# Patient Record
Sex: MALE | Race: WHITE | NOT HISPANIC OR LATINO | Employment: FULL TIME | URBAN - METROPOLITAN AREA
[De-identification: names, ages, dates, MRNs, and addresses within clinical notes are randomized per-mention and may not be internally consistent; named-entity substitution may affect disease eponyms.]

---

## 2017-02-20 NOTE — Clinical Note
February 21, 2017        Kevin Stoddard  [address] [place] [address]        Dear Kevin:      .This letter is to inform you the you are due for an annual appointment in March. Please contact our scheduling department at [phone] To schedule       If you have any questions or concerns, please don't hesitate to call.        Sincerely,        FRANKIE Su.    Electronically Signed

## 2017-02-21 RX ORDER — NIACIN 750 MG/1
TABLET, EXTENDED RELEASE ORAL
Qty: 180 TAB | Refills: 3 | Status: SHIPPED | OUTPATIENT
Start: 2017-02-21 | End: 2017-02-24 | Stop reason: SDUPTHER

## 2017-02-24 ENCOUNTER — PATIENT MESSAGE (OUTPATIENT)
Dept: MEDICAL GROUP | Facility: MEDICAL CENTER | Age: 52
End: 2017-02-24

## 2017-02-24 RX ORDER — NIACIN 750 MG/1
TABLET, EXTENDED RELEASE ORAL
Qty: 180 TAB | Refills: 0 | Status: SHIPPED | OUTPATIENT
Start: 2017-02-24 | End: 2017-11-08 | Stop reason: SDUPTHER

## 2017-04-17 ENCOUNTER — OFFICE VISIT (OUTPATIENT)
Dept: MEDICAL GROUP | Facility: PHYSICIAN GROUP | Age: 52
End: 2017-04-17
Payer: COMMERCIAL

## 2017-04-17 VITALS
SYSTOLIC BLOOD PRESSURE: 118 MMHG | DIASTOLIC BLOOD PRESSURE: 82 MMHG | TEMPERATURE: 97.7 F | HEIGHT: 68 IN | BODY MASS INDEX: 32.74 KG/M2 | OXYGEN SATURATION: 94 % | HEART RATE: 78 BPM | WEIGHT: 216 LBS | RESPIRATION RATE: 16 BRPM

## 2017-04-17 DIAGNOSIS — Z76.89 ENCOUNTER TO ESTABLISH CARE WITH NEW DOCTOR: ICD-10-CM

## 2017-04-17 DIAGNOSIS — Z12.11 COLON CANCER SCREENING: ICD-10-CM

## 2017-04-17 DIAGNOSIS — Z12.83 SKIN CANCER SCREENING: ICD-10-CM

## 2017-04-17 DIAGNOSIS — K21.9 GASTROESOPHAGEAL REFLUX DISEASE WITHOUT ESOPHAGITIS: ICD-10-CM

## 2017-04-17 PROCEDURE — 99214 OFFICE O/P EST MOD 30 MIN: CPT | Performed by: NURSE PRACTITIONER

## 2017-04-17 ASSESSMENT — PATIENT HEALTH QUESTIONNAIRE - PHQ9: CLINICAL INTERPRETATION OF PHQ2 SCORE: 0

## 2017-04-17 NOTE — ASSESSMENT & PLAN NOTE
Patient is aware of BMI elevation.  Brief discussion of diet, exercise, and lifestyle modification.

## 2017-04-17 NOTE — Clinical Note
[facility]  DOROTHY IsaacsPFreddieN.  [address]  [place] [address]  Fax: [phone]   Authorization for Release/Disclosure of   Protected Health Information   Name: PARMINDER STONE : 1965 SSN: [id number]   Address: [address] [place] [address] [place] [address] Phone:    [phone] (home) [phone] (work)   I authorize the entity listed below to release/disclose the PHI below to:   [facility]/JOE Isaacs and JOE Isaacs   Provider or Entity Name:   Dr. Ortiz/ [facility] Address City, State, Zip   Phone:   [phone]    Fax:   [phone]   Reason for request: continuity of care   Information to be released:    [  ] LAST COLONOSCOPY,  including any PATH REPORT and follow-up  [  ] LAST FIT/COLOGUARD RESULT [  ] LAST DEXA  [  ] LAST MAMMOGRAM  [  ] LAST PAP  [  ] LAST LABS [  ] RETINA EXAM REPORT  [  ] IMMUNIZATION RECORDS  [x] Release all info      [  ] Check here and initial the line next to each item to release ALL health information INCLUDING  _____ Care and treatment for drug and / or alcohol abuse  _____ HIV testing, infection status, or AIDS  _____ Genetic Testing    DATES OF SERVICE OR TIME PERIOD TO BE DISCLOSED: _____________  I understand and acknowledge that:  * This Authorization may be revoked at any time by you in writing, except if your health information has already been used or disclosed.  * Your health information that will be used or disclosed as a result of you signing this authorization could be re-disclosed by the recipient. If this occurs, your re-disclosed health information may no longer be protected by State or Federal laws.  * You may refuse to sign this Authorization. Your refusal will not affect your ability to obtain treatment.  * This Authorization becomes effective upon signing and will  on (date) __________.      If no date is indicated, this Authorization will  one (1) year from the signature date.       Name: Kevin Dick Richi    Signature:   Date:     4/17/2017       PLEASE FAX REQUESTED RECORDS BACK TO: [phone]

## 2017-04-17 NOTE — MR AVS SNAPSHOT
"        Kevin Stoddard   2017 8:40 AM   Office Visit   MRN: [id number]    Department:  [facility]   Dept Phone:  [phone]    Description:  Male : 1965   Provider:  JOE Isaacs           Reason for Visit     Establish Care           Allergies as of 2017     Allergen Noted Reactions    Nkda [No Known Drug Allergy] 2008         You were diagnosed with     Encounter to establish care with new doctor   [271499]       Skin cancer screening   [827395]       BMI 32.0-32.9,adult   [740551]       Colon cancer screening   [726433]         Vital Signs     Blood Pressure Pulse Temperature Respirations Height Weight    118/82 mmHg 78 36.5 °C (97.7 °F) 16 1.727 m (5' 7.99\") 97.977 kg (216 lb)    Body Mass Index Oxygen Saturation Smoking Status             32.85 kg/m2 94% Never Smoker          Basic Information     Date Of Birth Sex Race Ethnicity Preferred Language    1965 Male White Non- English      Problem List              ICD-10-CM Priority Class Noted - Resolved    Hyperlipidemia E78.5   2015 - Present    Elevated fasting glucose R73.01   2015 - Present    Left foot pain M79.672   2015 - Present    Ganglion of right wrist M67.431   2015 - Present    Gastroesophageal reflux disease without esophagitis K21.9   3/4/2016 - Present    Encounter to establish care with new doctor Z71.89   2017 - Present      Health Maintenance        Date Due Completion Dates    COLONOSCOPY 2017 ---    IMM DTaP/Tdap/Td Vaccine (2 - Td) 2025            Current Immunizations     Influenza Vaccine Quad Inj (Pf) 2016 10:13 AM, 10/9/2014    Influenza Vaccine Quad Inj (Preserved) 10/23/2015    Tdap Vaccine 2015      Below and/or attached are the medications your provider expects you to take. Review all of your home medications and newly ordered medications with your provider and/or pharmacist. Follow medication instructions as " directed by your provider and/or pharmacist. Please keep your medication list with you and share with your provider. Update the information when medications are discontinued, doses are changed, or new medications (including over-the-counter products) are added; and carry medication information at all times in the event of emergency situations     Allergies:  NKDA - (reactions not documented)               Medications  Valid as of: April 17, 2017 -  9:31 AM    Generic Name Brand Name Tablet Size Instructions for use    Fluticasone Propionate (Suspension) FLONASE 50 MCG/ACT Spray 2 Sprays in nose every day.        Multiple Vitamins-Minerals (Tab) MENS MULTI VITAMIN & MINERAL  Take  by mouth.        Niacin (Antihyperlipidemic) (Tab CR) NIASPAN 750 MG TAKE TWO TABLETS BY MOUTH DAILY        Omega-3 Fatty Acids (Cap) Fish Oil 1200 MG Take 2 Capsule by mouth every day.        Omeprazole Magnesium (Tablet Delayed Response) PRILOSEC OTC 20 MG Take 20 mg by mouth every day.        Simvastatin (Tab) ZOCOR 20 MG Take 1 Tab by mouth every evening.        .                 Medicines prescribed today were sent to:     [place] [address] [place] [address] [place] [address] [facility] [address]    Phone: [phone] Fax: [phone]    Open 24 Hours?: No      Medication refill instructions:       If your prescription bottle indicates you have medication refills left, it is not necessary to call your provider’s office. Please contact your pharmacy and they will refill your medication.    If your prescription bottle indicates you do not have any refills left, you may request refills at any time through one of the following ways: The online [employer] system (except Urgent Care), by calling your provider’s office, or by asking your pharmacy to contact your provider’s office with a refill request. Medication refills are processed only during regular business hours and may not be available until the next business day. Your  provider may request additional information or to have a follow-up visit with you prior to refilling your medication.   *Please Note: Medication refills are assigned a new Rx number when refilled electronically. Your pharmacy may indicate that no refills were authorized even though a new prescription for the same medication is available at the pharmacy. Please request the medicine by name with the pharmacy before contacting your provider for a refill.        Referral     A referral request has been sent to our patient care coordination department. Please allow 3-5 business days for us to process this request and contact you either by phone or mail. If you do not hear from us by the 5th business day, please call us at [phone].           Pro V&V Access Code: Activation code not generated  Current Pro V&V Status: Active

## 2017-04-17 NOTE — PROGRESS NOTES
Chief Complaint   Patient presents with   • Establish Care       HISTORY OF PRESENT ILLNESS: Patient is a 52 y.o. male  patient who is here to reestablish care with me in this office.  He was a patient of my previous office.  He is here today to discuss the following issues:    Encounter to establish care with new doctor  Is here to reestablish care with me.  Was seen by Dr. Putnam in the interim.    Gastroesophageal reflux disease without esophagitis  Stable on prilosec.      Skin cancer screening  Would like a referral to dermatology for a skin screening.    Colon cancer screening  Is due for screening colonoscopy.    BMI 32.0-32.9,adult  Patient is aware of BMI elevation.  Brief discussion of diet, exercise, and lifestyle modification.        Patient Active Problem List    Diagnosis Date Noted   • Encounter to establish care with new doctor 04/17/2017   • Skin cancer screening 04/17/2017   • Colon cancer screening 04/17/2017   • BMI 32.0-32.9,adult 04/17/2017   • Gastroesophageal reflux disease without esophagitis 03/04/2016   • Hyperlipidemia 11/06/2015   • Elevated fasting glucose 11/06/2015   • Left foot pain 11/06/2015   • Ganglion of right wrist 11/06/2015       Allergies:Nkda    Current Outpatient Prescriptions   Medication Sig Dispense Refill   • niacin SR (NIASPAN) 750 MG Tab CR TAKE TWO TABLETS BY MOUTH DAILY 180 Tab 0   • fluticasone (FLONASE) 50 MCG/ACT nasal spray Spray 2 Sprays in nose every day.     • simvastatin (ZOCOR) 20 MG Tab Take 1 Tab by mouth every evening. 30 Tab 11   • Omega-3 Fatty Acids (FISH OIL) 1200 MG Cap Take 2 Capsule by mouth every day.     • Multiple Vitamins-Minerals (MENS MULTI VITAMIN & MINERAL) Tab Take  by mouth.     • omeprazole (PRILOSEC OTC) 20 MG tablet Take 20 mg by mouth every day.       No current facility-administered medications for this visit.       Social History   Substance Use Topics   • Smoking status: Never Smoker    • Smokeless tobacco: Never Used   •  "Alcohol Use: 0.0 oz/week     0 Standard drinks or equivalent per week      Comment: 1 per month       Family Status   Relation Status Death Age   • Mother     • Father Alive    • Sister Alive    • Brother Alive    • Son Alive    • Son Alive      Family History   Problem Relation Age of Onset   • Cancer Mother      ovarian, breast   • Heart Disease Father    • Diabetes Father    • Diabetes Brother        Review of Systems:   Constitutional: Negative for fever, chills, weight loss and malaise/fatigue.   HENT: Negative for ear pain, nosebleeds, congestion, sore throat and neck pain.    Eyes: Negative for blurred vision.   Respiratory: Negative for cough, sputum production, shortness of breath and wheezing.    Cardiovascular: Negative for chest pain, palpitations, orthopnea and leg swelling.   Gastrointestinal: Negative for heartburn, nausea, vomiting and abdominal pain.   Genitourinary: Negative for dysuria, urgency and frequency.   Musculoskeletal: Negative for myalgias, joint pain, and back pain.  Skin: Negative for rash and itching. Would like a skin check with dermatology.  Neurological: Negative for dizziness, tingling, tremors, sensory change, focal weakness and headaches.   Endo/Heme/Allergies: Does not bruise/bleed easily.   Psychiatric/Behavioral: Negative for depression, suicidal ideas and memory loss.  The patient is not nervous/anxious and does not have insomnia.    All other systems reviewed and are negative except as in HPI.    Exam:  Blood pressure 118/82, pulse 78, temperature 36.5 °C (97.7 °F), resp. rate 16, height 1.727 m (5' 7.99\"), weight 97.977 kg (216 lb), SpO2 94 %.  General:  Well nourished, well developed male in NAD  Head: Grossly normal.  Neck: Supple without JVD or bruit. Thyroid is not enlarged.  Pulmonary: Clear to ausculation. Normal effort. No rales, ronchi, or wheezing.  Cardiovascular: Regular rate and rhythm without murmur.   Extremities: No clubbing, cyanosis, or " edema.  Skin: Intact with no obvious rashes or lesions.  Neuro: Grossly intact.  Psych: Alert and oriented x 3.  Mood and affect appropriate.    Medical decision-making and discussion: Kevin is here to reestablish care with me.  We reviewed his past medical history and discussed his current medications. Referrals were sent to dermatology and GI for colonoscopy. He will sign a records release for my previous office, he will sign up with [facility], and he will plan to follow-up here as needed.         Assessment/Plan:  1. Encounter to establish care with new doctor     2. Skin cancer screening  REFERRAL TO DERMATOLOGY   3. BMI 32.0-32.9,adult  Patient identified as having weight management issue.  Appropriate orders and counseling given.   4. Colon cancer screening  REFERRAL TO GI FOR COLONOSCOPY   5. Gastroesophageal reflux disease without esophagitis         Return if symptoms worsen or fail to improve.    Please note that this dictation was created using voice recognition software. I have made every reasonable attempt to correct obvious errors, but I expect that there are errors of grammar and possibly content that I did not discover before finalizing the note.

## 2017-04-19 DIAGNOSIS — Z91.89 RISK OF EXPOSURE TO LYME DISEASE: ICD-10-CM

## 2017-04-19 DIAGNOSIS — R53.83 OTHER FATIGUE: ICD-10-CM

## 2017-04-19 DIAGNOSIS — E78.5 HYPERLIPIDEMIA, UNSPECIFIED HYPERLIPIDEMIA TYPE: ICD-10-CM

## 2017-04-19 DIAGNOSIS — M25.50 ARTHRALGIA, UNSPECIFIED JOINT: ICD-10-CM

## 2017-04-24 ENCOUNTER — HOSPITAL ENCOUNTER (OUTPATIENT)
Dept: LAB | Facility: MEDICAL CENTER | Age: 52
End: 2017-04-24
Attending: NURSE PRACTITIONER
Payer: COMMERCIAL

## 2017-04-24 DIAGNOSIS — Z91.89 RISK OF EXPOSURE TO LYME DISEASE: ICD-10-CM

## 2017-04-24 DIAGNOSIS — M25.50 ARTHRALGIA, UNSPECIFIED JOINT: ICD-10-CM

## 2017-04-24 DIAGNOSIS — R53.83 OTHER FATIGUE: ICD-10-CM

## 2017-04-24 LAB
BASOPHILS # BLD AUTO: 0.6 % (ref 0–1.8)
BASOPHILS # BLD: 0.03 K/UL (ref 0–0.12)
CRP SERPL HS-MCNC: 1.5 MG/L (ref 0–7.5)
EOSINOPHIL # BLD AUTO: 0.17 K/UL (ref 0–0.51)
EOSINOPHIL NFR BLD: 3.3 % (ref 0–6.9)
ERYTHROCYTE [DISTWIDTH] IN BLOOD BY AUTOMATED COUNT: 43.9 FL (ref 35.9–50)
ERYTHROCYTE [SEDIMENTATION RATE] IN BLOOD BY WESTERGREN METHOD: 6 MM/HOUR (ref 0–20)
HCT VFR BLD AUTO: 45.5 % (ref 42–52)
HCYS SERPL-SCNC: 8.89 UMOL/L
HGB BLD-MCNC: 15.1 G/DL (ref 14–18)
IMM GRANULOCYTES # BLD AUTO: 0 K/UL (ref 0–0.11)
IMM GRANULOCYTES NFR BLD AUTO: 0 % (ref 0–0.9)
LYMPHOCYTES # BLD AUTO: 2.06 K/UL (ref 1–4.8)
LYMPHOCYTES NFR BLD: 40 % (ref 22–41)
MCH RBC QN AUTO: 28 PG (ref 27–33)
MCHC RBC AUTO-ENTMCNC: 33.2 G/DL (ref 33.7–35.3)
MCV RBC AUTO: 84.4 FL (ref 81.4–97.8)
MONOCYTES # BLD AUTO: 0.61 K/UL (ref 0–0.85)
MONOCYTES NFR BLD AUTO: 11.8 % (ref 0–13.4)
NEUTROPHILS # BLD AUTO: 2.28 K/UL (ref 1.82–7.42)
NEUTROPHILS NFR BLD: 44.3 % (ref 44–72)
NRBC # BLD AUTO: 0 K/UL
NRBC BLD AUTO-RTO: 0 /100 WBC
PLATELET # BLD AUTO: 258 K/UL (ref 164–446)
PMV BLD AUTO: 9.8 FL (ref 9–12.9)
PSA SERPL-MCNC: 0.27 NG/ML (ref 0–4)
RBC # BLD AUTO: 5.39 M/UL (ref 4.7–6.1)
TESTOST SERPL-MCNC: 269 NG/DL (ref 175–781)
URATE SERPL-MCNC: 6.9 MG/DL (ref 2.5–8.3)
WBC # BLD AUTO: 5.2 K/UL (ref 4.8–10.8)

## 2017-04-24 PROCEDURE — 85025 COMPLETE CBC W/AUTO DIFF WBC: CPT

## 2017-04-24 PROCEDURE — 36415 COLL VENOUS BLD VENIPUNCTURE: CPT

## 2017-04-24 PROCEDURE — 86617 LYME DISEASE ANTIBODY: CPT

## 2017-04-24 PROCEDURE — 84550 ASSAY OF BLOOD/URIC ACID: CPT

## 2017-04-24 PROCEDURE — 83090 ASSAY OF HOMOCYSTEINE: CPT

## 2017-04-24 PROCEDURE — 84153 ASSAY OF PSA TOTAL: CPT

## 2017-04-24 PROCEDURE — 86160 COMPLEMENT ANTIGEN: CPT

## 2017-04-24 PROCEDURE — 85652 RBC SED RATE AUTOMATED: CPT

## 2017-04-24 PROCEDURE — 84403 ASSAY OF TOTAL TESTOSTERONE: CPT

## 2017-04-24 PROCEDURE — 86141 C-REACTIVE PROTEIN HS: CPT

## 2017-04-24 PROCEDURE — 81291 MTHFR GENE: CPT

## 2017-04-26 LAB
B BURGDOR IGG SER QL IB: NEGATIVE
B BURGDOR IGM SER QL IB: NEGATIVE
TEST NAME 95000: NORMAL

## 2017-05-01 LAB
MTHFR C.1298A>C GENO BLD/T: NEGATIVE
MTHFR C.677C>T GENO BLD/T: NEGATIVE
MTHFR GENE MUT ANL BLD/T: NORMAL

## 2017-05-02 LAB — MISCELLANEOUS LAB RESULT MISCLAB: NORMAL

## 2017-05-10 LAB — MISCELLANEOUS LAB RESULT MISCLAB: NORMAL

## 2017-05-16 RX ORDER — SIMVASTATIN 20 MG
20 TABLET ORAL EVERY EVENING
Qty: 90 TAB | Refills: 1 | Status: SHIPPED | OUTPATIENT
Start: 2017-05-16 | End: 2017-11-08 | Stop reason: SDUPTHER

## 2017-05-16 NOTE — TELEPHONE ENCOUNTER
See MA's notes below, pt has met protocol, OV 4/17   Lab Results   Component Value Date/Time    LDL CHOLESTEROL 112* 05/05/2015 12:29 PM    HDL 47 11/17/2016 06:48 AM

## 2017-05-16 NOTE — TELEPHONE ENCOUNTER
Pt has had OV within the 12 month protocol and lipid panel is current from 11/16. 6 month supply sent to pharmacy.   Lab Results   Component Value Date/Time    CHOLESTEROL, 11/17/2016 06:48 AM    LDL 51 11/17/2016 06:48 AM    HDL 47 11/17/2016 06:48 AM    TRIGLYCERIDES 141 11/17/2016 06:48 AM       Lab Results   Component Value Date/Time    SODIUM 141 11/14/2015 10:41 AM    POTASSIUM 4.1 11/14/2015 10:41 AM    CHLORIDE 105 11/14/2015 10:41 AM    CO2 29 11/14/2015 10:41 AM    GLUCOSE 111* 11/17/2016 06:48 AM    BUN 13 11/14/2015 10:41 AM    CREATININE 1.19 11/14/2015 10:41 AM     Lab Results   Component Value Date/Time    ALKALINE PHOSPHATASE 59 11/14/2015 10:41 AM    AST(SGOT) 35 11/14/2015 10:41 AM    ALT(SGPT) 54* 11/14/2015 10:41 AM    TOTAL BILIRUBIN 0.6 11/14/2015 10:41 AM

## 2017-05-16 NOTE — TELEPHONE ENCOUNTER
Was the patient seen in the last year in this department? Yes     Does patient have an active prescription for medications requested? No     Received Request Via: Pharmacy     Iraj with [facility] requesting refill. Ph: [phone]

## 2017-09-07 ENCOUNTER — RX ONLY (OUTPATIENT)
Age: 52
Setting detail: RX ONLY
End: 2017-09-07

## 2017-11-08 DIAGNOSIS — E78.5 HYPERLIPIDEMIA, UNSPECIFIED HYPERLIPIDEMIA TYPE: ICD-10-CM

## 2017-11-08 DIAGNOSIS — R73.01 ELEVATED FASTING GLUCOSE: ICD-10-CM

## 2017-11-08 NOTE — TELEPHONE ENCOUNTER
Was the patient seen in the last year in this department? Yes     Does patient have an active prescription for medications requested? No     Received Request Via: Patient   PATIENT SWITCHED PHARMACIES.

## 2017-11-08 NOTE — TELEPHONE ENCOUNTER
Was the patient seen in the last year in this department? Yes     Does patient have an active prescription for medications requested? No     Received Request Via: Pharmacy      Pt met protocol?: Yes, labs 11/15 ov 4/17

## 2017-11-09 RX ORDER — SIMVASTATIN 20 MG
20 TABLET ORAL EVERY EVENING
Qty: 90 TAB | Refills: 0 | Status: SHIPPED | OUTPATIENT
Start: 2017-11-09 | End: 2018-02-07 | Stop reason: SDUPTHER

## 2017-11-09 RX ORDER — NIACIN 750 MG/1
TABLET, EXTENDED RELEASE ORAL
Qty: 180 TAB | Refills: 0 | Status: SHIPPED | OUTPATIENT
Start: 2017-11-09 | End: 2018-05-02

## 2018-02-08 RX ORDER — SIMVASTATIN 20 MG
TABLET ORAL
Qty: 90 TAB | Refills: 0 | Status: SHIPPED | OUTPATIENT
Start: 2018-02-08 | End: 2023-06-14

## 2018-02-08 NOTE — TELEPHONE ENCOUNTER
Was the patient seen in the last year in this department? Yes     Does patient have an active prescription for medications requested? No     Received Request Via: Pharmacy      Pt met protocol?: No pt last ov 4/17   Lab Results  Component Value Date/Time   CHOLSTRLTOT 126 11/17/2016 0648   TRIGLYCERIDE 141 11/17/2016 0648   HDL 47 11/17/2016 0648   LDL 51 11/17/2016 0648

## 2018-05-02 ENCOUNTER — APPOINTMENT (OUTPATIENT)
Dept: RADIOLOGY | Facility: MEDICAL CENTER | Age: 53
End: 2018-05-02
Attending: EMERGENCY MEDICINE
Payer: OTHER MISCELLANEOUS

## 2018-05-02 ENCOUNTER — HOSPITAL ENCOUNTER (OUTPATIENT)
Facility: MEDICAL CENTER | Age: 53
End: 2018-05-03
Attending: EMERGENCY MEDICINE | Admitting: HOSPITALIST
Payer: OTHER MISCELLANEOUS

## 2018-05-02 DIAGNOSIS — R42 DIZZINESS: ICD-10-CM

## 2018-05-02 DIAGNOSIS — R27.0 ATAXIA: ICD-10-CM

## 2018-05-02 LAB
ALBUMIN SERPL BCP-MCNC: 4.5 G/DL (ref 3.2–4.9)
ALBUMIN/GLOB SERPL: 1.8 G/DL
ALP SERPL-CCNC: 74 U/L (ref 30–99)
ALT SERPL-CCNC: 105 U/L (ref 2–50)
ANION GAP SERPL CALC-SCNC: 11 MMOL/L (ref 0–11.9)
APTT PPP: 22.9 SEC (ref 24.7–36)
AST SERPL-CCNC: 56 U/L (ref 12–45)
BASOPHILS # BLD AUTO: 1 % (ref 0–1.8)
BASOPHILS # BLD: 0.06 K/UL (ref 0–0.12)
BILIRUB SERPL-MCNC: 0.6 MG/DL (ref 0.1–1.5)
BUN SERPL-MCNC: 14 MG/DL (ref 8–22)
CALCIUM SERPL-MCNC: 9.7 MG/DL (ref 8.5–10.5)
CHLORIDE SERPL-SCNC: 105 MMOL/L (ref 96–112)
CO2 SERPL-SCNC: 21 MMOL/L (ref 20–33)
CREAT SERPL-MCNC: 1.01 MG/DL (ref 0.5–1.4)
EKG IMPRESSION: NORMAL
EOSINOPHIL # BLD AUTO: 0.16 K/UL (ref 0–0.51)
EOSINOPHIL NFR BLD: 2.6 % (ref 0–6.9)
ERYTHROCYTE [DISTWIDTH] IN BLOOD BY AUTOMATED COUNT: 43.8 FL (ref 35.9–50)
EST. AVERAGE GLUCOSE BLD GHB EST-MCNC: 126 MG/DL
GLOBULIN SER CALC-MCNC: 2.5 G/DL (ref 1.9–3.5)
GLUCOSE SERPL-MCNC: 133 MG/DL (ref 65–99)
HBA1C MFR BLD: 6 % (ref 0–5.6)
HCT VFR BLD AUTO: 43.5 % (ref 42–52)
HGB BLD-MCNC: 14.8 G/DL (ref 14–18)
IMM GRANULOCYTES # BLD AUTO: 0.02 K/UL (ref 0–0.11)
IMM GRANULOCYTES NFR BLD AUTO: 0.3 % (ref 0–0.9)
INR PPP: 1.01 (ref 0.87–1.13)
LYMPHOCYTES # BLD AUTO: 2 K/UL (ref 1–4.8)
LYMPHOCYTES NFR BLD: 32.2 % (ref 22–41)
MCH RBC QN AUTO: 27.7 PG (ref 27–33)
MCHC RBC AUTO-ENTMCNC: 34 G/DL (ref 33.7–35.3)
MCV RBC AUTO: 81.5 FL (ref 81.4–97.8)
MONOCYTES # BLD AUTO: 0.6 K/UL (ref 0–0.85)
MONOCYTES NFR BLD AUTO: 9.7 % (ref 0–13.4)
NEUTROPHILS # BLD AUTO: 3.37 K/UL (ref 1.82–7.42)
NEUTROPHILS NFR BLD: 54.2 % (ref 44–72)
NRBC # BLD AUTO: 0 K/UL
NRBC BLD-RTO: 0 /100 WBC
PLATELET # BLD AUTO: 299 K/UL (ref 164–446)
PMV BLD AUTO: 9.9 FL (ref 9–12.9)
POTASSIUM SERPL-SCNC: 3.9 MMOL/L (ref 3.6–5.5)
PROT SERPL-MCNC: 7 G/DL (ref 6–8.2)
PROTHROMBIN TIME: 13 SEC (ref 12–14.6)
RBC # BLD AUTO: 5.34 M/UL (ref 4.7–6.1)
SODIUM SERPL-SCNC: 137 MMOL/L (ref 135–145)
TROPONIN I SERPL-MCNC: <0.01 NG/ML (ref 0–0.04)
WBC # BLD AUTO: 6.2 K/UL (ref 4.8–10.8)

## 2018-05-02 PROCEDURE — 99220 PR INITIAL OBSERVATION CARE,LEVL III: CPT | Performed by: HOSPITALIST

## 2018-05-02 PROCEDURE — A9270 NON-COVERED ITEM OR SERVICE: HCPCS | Performed by: HOSPITALIST

## 2018-05-02 PROCEDURE — 99285 EMERGENCY DEPT VISIT HI MDM: CPT

## 2018-05-02 PROCEDURE — 700102 HCHG RX REV CODE 250 W/ 637 OVERRIDE(OP): Performed by: HOSPITALIST

## 2018-05-02 PROCEDURE — 96375 TX/PRO/DX INJ NEW DRUG ADDON: CPT

## 2018-05-02 PROCEDURE — G0378 HOSPITAL OBSERVATION PER HR: HCPCS

## 2018-05-02 PROCEDURE — 83036 HEMOGLOBIN GLYCOSYLATED A1C: CPT

## 2018-05-02 PROCEDURE — 700111 HCHG RX REV CODE 636 W/ 250 OVERRIDE (IP): Performed by: EMERGENCY MEDICINE

## 2018-05-02 PROCEDURE — 71045 X-RAY EXAM CHEST 1 VIEW: CPT

## 2018-05-02 PROCEDURE — 85025 COMPLETE CBC W/AUTO DIFF WBC: CPT

## 2018-05-02 PROCEDURE — 94760 N-INVAS EAR/PLS OXIMETRY 1: CPT

## 2018-05-02 PROCEDURE — 93005 ELECTROCARDIOGRAM TRACING: CPT

## 2018-05-02 PROCEDURE — 80053 COMPREHEN METABOLIC PANEL: CPT

## 2018-05-02 PROCEDURE — 70450 CT HEAD/BRAIN W/O DYE: CPT

## 2018-05-02 PROCEDURE — 84484 ASSAY OF TROPONIN QUANT: CPT

## 2018-05-02 PROCEDURE — 85610 PROTHROMBIN TIME: CPT

## 2018-05-02 PROCEDURE — 96374 THER/PROPH/DIAG INJ IV PUSH: CPT

## 2018-05-02 PROCEDURE — 93005 ELECTROCARDIOGRAM TRACING: CPT | Performed by: EMERGENCY MEDICINE

## 2018-05-02 PROCEDURE — 85730 THROMBOPLASTIN TIME PARTIAL: CPT

## 2018-05-02 PROCEDURE — 96361 HYDRATE IV INFUSION ADD-ON: CPT

## 2018-05-02 PROCEDURE — 700105 HCHG RX REV CODE 258: Performed by: EMERGENCY MEDICINE

## 2018-05-02 RX ORDER — PROMETHAZINE HYDROCHLORIDE 25 MG/1
12.5-25 TABLET ORAL EVERY 4 HOURS PRN
Status: DISCONTINUED | OUTPATIENT
Start: 2018-05-02 | End: 2018-05-03 | Stop reason: HOSPADM

## 2018-05-02 RX ORDER — OMEPRAZOLE 20 MG/1
20 CAPSULE, DELAYED RELEASE ORAL DAILY
Status: DISCONTINUED | OUTPATIENT
Start: 2018-05-03 | End: 2018-05-03 | Stop reason: HOSPADM

## 2018-05-02 RX ORDER — PROMETHAZINE HYDROCHLORIDE 25 MG/1
12.5-25 SUPPOSITORY RECTAL EVERY 4 HOURS PRN
Status: DISCONTINUED | OUTPATIENT
Start: 2018-05-02 | End: 2018-05-03 | Stop reason: HOSPADM

## 2018-05-02 RX ORDER — ONDANSETRON 2 MG/ML
INJECTION INTRAMUSCULAR; INTRAVENOUS
Status: DISPENSED
Start: 2018-05-02 | End: 2018-05-02

## 2018-05-02 RX ORDER — FLUTICASONE PROPIONATE 50 MCG
2 SPRAY, SUSPENSION (ML) NASAL DAILY
Status: DISCONTINUED | OUTPATIENT
Start: 2018-05-03 | End: 2018-05-03 | Stop reason: HOSPADM

## 2018-05-02 RX ORDER — MECLIZINE HYDROCHLORIDE 25 MG/1
25 TABLET ORAL 3 TIMES DAILY PRN
Status: DISCONTINUED | OUTPATIENT
Start: 2018-05-02 | End: 2018-05-03 | Stop reason: HOSPADM

## 2018-05-02 RX ORDER — MIDAZOLAM HYDROCHLORIDE 1 MG/ML
3 INJECTION INTRAMUSCULAR; INTRAVENOUS ONCE
Status: DISCONTINUED | OUTPATIENT
Start: 2018-05-02 | End: 2018-05-03

## 2018-05-02 RX ORDER — ONDANSETRON 2 MG/ML
4 INJECTION INTRAMUSCULAR; INTRAVENOUS EVERY 4 HOURS PRN
Status: DISCONTINUED | OUTPATIENT
Start: 2018-05-02 | End: 2018-05-03 | Stop reason: HOSPADM

## 2018-05-02 RX ORDER — LORAZEPAM 2 MG/ML
1 INJECTION INTRAMUSCULAR ONCE
Status: COMPLETED | OUTPATIENT
Start: 2018-05-02 | End: 2018-05-02

## 2018-05-02 RX ORDER — PROMETHAZINE HYDROCHLORIDE 25 MG/1
25 SUPPOSITORY RECTAL ONCE
Status: DISCONTINUED | OUTPATIENT
Start: 2018-05-02 | End: 2018-05-03

## 2018-05-02 RX ORDER — SIMVASTATIN 20 MG
20 TABLET ORAL EVERY EVENING
Status: DISCONTINUED | OUTPATIENT
Start: 2018-05-02 | End: 2018-05-03 | Stop reason: HOSPADM

## 2018-05-02 RX ORDER — ONDANSETRON 4 MG/1
4 TABLET, ORALLY DISINTEGRATING ORAL EVERY 4 HOURS PRN
Status: DISCONTINUED | OUTPATIENT
Start: 2018-05-02 | End: 2018-05-03 | Stop reason: HOSPADM

## 2018-05-02 RX ORDER — POLYETHYLENE GLYCOL 3350 17 G/17G
1 POWDER, FOR SOLUTION ORAL
Status: DISCONTINUED | OUTPATIENT
Start: 2018-05-02 | End: 2018-05-03 | Stop reason: HOSPADM

## 2018-05-02 RX ORDER — AMOXICILLIN 250 MG
2 CAPSULE ORAL 2 TIMES DAILY
Status: DISCONTINUED | OUTPATIENT
Start: 2018-05-02 | End: 2018-05-03 | Stop reason: HOSPADM

## 2018-05-02 RX ORDER — SODIUM CHLORIDE 9 MG/ML
1000 INJECTION, SOLUTION INTRAVENOUS ONCE
Status: COMPLETED | OUTPATIENT
Start: 2018-05-02 | End: 2018-05-02

## 2018-05-02 RX ORDER — ONDANSETRON 2 MG/ML
8 INJECTION INTRAMUSCULAR; INTRAVENOUS ONCE
Status: COMPLETED | OUTPATIENT
Start: 2018-05-02 | End: 2018-05-02

## 2018-05-02 RX ORDER — BISACODYL 10 MG
10 SUPPOSITORY, RECTAL RECTAL
Status: DISCONTINUED | OUTPATIENT
Start: 2018-05-02 | End: 2018-05-03 | Stop reason: HOSPADM

## 2018-05-02 RX ADMIN — LORAZEPAM 1 MG: 2 INJECTION INTRAMUSCULAR; INTRAVENOUS at 10:36

## 2018-05-02 RX ADMIN — SODIUM CHLORIDE 1000 ML: 9 INJECTION, SOLUTION INTRAVENOUS at 10:36

## 2018-05-02 RX ADMIN — ONDANSETRON 8 MG: 2 INJECTION, SOLUTION INTRAMUSCULAR; INTRAVENOUS at 10:36

## 2018-05-02 RX ADMIN — SIMVASTATIN 20 MG: 20 TABLET, FILM COATED ORAL at 20:24

## 2018-05-02 ASSESSMENT — PATIENT HEALTH QUESTIONNAIRE - PHQ9
2. FEELING DOWN, DEPRESSED, IRRITABLE, OR HOPELESS: NOT AT ALL
1. LITTLE INTEREST OR PLEASURE IN DOING THINGS: NOT AT ALL
2. FEELING DOWN, DEPRESSED, IRRITABLE, OR HOPELESS: NOT AT ALL
SUM OF ALL RESPONSES TO PHQ9 QUESTIONS 1 AND 2: 0
SUM OF ALL RESPONSES TO PHQ9 QUESTIONS 1 AND 2: 0
1. LITTLE INTEREST OR PLEASURE IN DOING THINGS: NOT AT ALL

## 2018-05-02 ASSESSMENT — ENCOUNTER SYMPTOMS
PALPITATIONS: 0
DIZZINESS: 1
HEADACHES: 0
SPUTUM PRODUCTION: 0
NAUSEA: 0
NECK PAIN: 0
CHILLS: 0
VOMITING: 0
ORTHOPNEA: 0
COUGH: 0
HEMOPTYSIS: 0
BACK PAIN: 0

## 2018-05-02 ASSESSMENT — PAIN SCALES - GENERAL
PAINLEVEL_OUTOF10: 0

## 2018-05-02 ASSESSMENT — LIFESTYLE VARIABLES
ALCOHOL_USE: NO
DO YOU DRINK ALCOHOL: NO

## 2018-05-02 NOTE — ED NOTES
Discussed additional med with pt (phenergan suppository) and gave option for PO after speaking with MD. Pt states feeling better now that stretcher is not moving. Would like to hold med for now. Will call RN if needs it.

## 2018-05-02 NOTE — ED PROVIDER NOTES
"ED Provider Note    CHIEF COMPLAINT  Chief Complaint   Patient presents with   • N/V     pt reports that symptoms started today at 0730 after eating in cafeteria here.   • Dizziness   • Weakness       HPI  Kevin Stoddard is a 53 y.o. male who presents for evaluation of dizziness and vomiting.  The patient states that he felt fine when he got up this morning.  He brought his father into the hospital for replacement of a pacemaker.  While sitting in the waiting area he developed dizziness and some of which somewhat vertiginous and also feels like he might be off balance when he was walking.  He then developed significant nausea with couple bouts of vomiting associated with this along with weakness and some diaphoresis.  Patient states he feels like he could be short of breath, but states he may \"just be hyperventilating\".  He denies recent: Fever, chills, URI symptoms, chest pain, abdominal pain, hematemesis, melena, hematochezia, hematuria, dysuria, unilateral motor weakness or paresthesias, difficulty speaking, headache.  No other acute symptomatology or complaints.    REVIEW OF SYSTEMS  See HPI for further details.  He denies a history of: Protection, diabetes, thyroid dysfunction, seizures, cardiopulmonary disorders, gastrointestinal disorders.  All other systems negative.    PAST MEDICAL HISTORY  Past Medical History:   Diagnosis Date   • Cancer (HCC)     basal cell , face   • Indigestion    • kidney stones    Dyslipidemia    FAMILY HISTORY  Family History   Problem Relation Age of Onset   • Cancer Mother      ovarian, breast   • Heart Disease Father    • Diabetes Father    • Diabetes Brother        SOCIAL HISTORY  Nonsmoker; occasional alcohol use; no drug use;    SURGICAL HISTORY  Past Surgical History:   Procedure Laterality Date   • GANGLION EXCISION Right 6/3/2016    Procedure: GANGLION EXCISION WRIST;  Surgeon: Aman Easton M.D.;  Location: SURGERY SAME DAY [facility];  Service:    • " SEPTOTURBINOPLASTY  5/6/2010    Performed by ANURADHA JONAS at SURGERY SAME DAY [facility] ORS   • ETHMOIDECTOMY  5/6/2010    Performed by ANURADHA JONAS at SURGERY SAME DAY [facility] ORS   • ANTROSTOMY  5/6/2010    Performed by ANURADHA JONAS at SURGERY SAME DAY [facility] ORS   • OTHER      eus. tubes/adenoids   • OTHER ABDOMINAL SURGERY      vasectomy       CURRENT MEDICATIONS  Home Medications     Reviewed by Becky Campa R.N. (Registered Nurse) on 05/02/18 at [facility]  Med List Status: Partial   Medication Last Dose Status   fluticasone (FLONASE) 50 MCG/ACT nasal spray  Active   Multiple Vitamins-Minerals (MENS MULTI VITAMIN & MINERAL) Tab  Active   niacin SR (NIASPAN) 750 MG Tab CR  Active   Omega-3 Fatty Acids (FISH OIL) 1200 MG Cap  Active   omeprazole (PRILOSEC OTC) 20 MG tablet  Active   simvastatin (ZOCOR) 20 MG Tab  Active                ALLERGIES  Allergies   Allergen Reactions   • Nkda [No Known Drug Allergy]        PHYSICAL EXAM  VITAL SIGNS: /75   Pulse 82   Temp 37 °C (98.6 °F)   Resp 16   Wt 92.8 kg (204 lb 9.4 oz)   SpO2 98%   BMI 31.11 kg/m²    Constitutional: 53-year-old male, fairly Well developed, Well nourished, appears weak but oriented ×3  HENT: ,Atraumatic, Bilateral external ears normal, tympanic membranes clear, Oropharynx mildly dry, No oral exudates, Nose normal.   Eyes: PERRL, EOMI, Conjunctiva normal, No discharge.   Neck: Normal range of motion, No tenderness, Supple, No stridor.   Lymphatic: No lymphadenopathy noted.   Cardiovascular: Normal heart rate, Normal rhythm, No murmurs, No rubs, No gallops.   Thorax & Lungs: Normal Equal breath sounds, No respiratory distress, No wheezing, no stridor, no rales. No chest tenderness.   Abdomen: Soft, nontender, nondistended, no organomegaly, positive bowel sounds normal in quality. No guarding or rebound.  Skin: Mildly decreased skin turgor, pink, warm, dry. No rashes, petechiae, purpura. Normal capillary refill.   Back: No  tenderness, No CVA tenderness.   Extremities: Intact distal pulses, No edema, No tenderness, No cyanosis, No clubbing. Vascular: Pulses are 2+, symmetric in the upper and lower extremities.  Musculoskeletal: Good range of motion in all major joints. No tenderness to palpation or major deformities noted.   Neurologic: Alert & oriented x 3, Normal motor function, Normal sensory function, No gross focal deficits noted.   Psychiatric: Affect normal, Judgment normal, Mood normal.     EKG  I have interpreted: Rate normal, rhythm sinus, axis normal, P-R QRS Q-T intervals normal, no acute ischemic or injury changes, 12-lead EKG, no old tracing for comparison;    RADIOLOGY/PROCEDURES  CT-HEAD W/O   Final Result      1.  Head CT without contrast within normal limits. No evidence of acute cerebral infarction, hemorrhage or mass lesion.      DX-CHEST-PORTABLE (1 VIEW)   Final Result      No radiographic evidence of acute cardiopulmonary process.            COURSE & MEDICAL DECISION MAKING  Pertinent Labs & Imaging studies reviewed. (See chart for details)  1.  Monitor  2.  IV normal saline; IV fluids administered for clinical dehydration along with active vomiting  3.  Zofran, titrated  4.  Ativan, titrated    Laboratory studies: CBC and differential within normal limits; CMP shows a random glucose 233, AST 56, , otherwise within normal; coags within normal limits; troponin is 0.01;    Discussion/consultation: At this time, the patient presents for evaluation of dizziness.  Symptoms are difficult to differentiate between ataxia and dizziness versus peripheral vertigo.  Patient's initial workup has been negative.  The patient has had persistent vomiting despite the above noted treatment.  At this time, I spoke with the hospitalist on-call.  The patient will be admitted for further monitoring, treatment, care.    FINAL IMPRESSION  1. Dizziness    2. Ataxia           PLAN  1.  The patient will be admitted for further  monitoring, treatment, and care.    Electronically signed by: Guy G Gansert, 5/2/2018 10:11 AM

## 2018-05-02 NOTE — ED NOTES
Assumed care of pt from waiting room. Pt to room via WC.  Changed to gown, hooked to monitor- VSS. PIV placed with blood draw. Fall precautions in place. Call bell in reach. Awaiting MD eval/orders. Ongoing monitoring.

## 2018-05-02 NOTE — DISCHARGE PLANNING
Care Transition Team Assessment    Information Source  Orientation : Oriented x 4  Information Given By: Patient  Who is responsible for making decisions for patient? : Patient    Readmission Evaluation  Is this a readmission?: No    Elopement Risk  Legal Hold: No  Ambulatory or Self Mobile in Wheelchair: No-Not an Elopement Risk    Interdisciplinary Discharge Planning  Does Admitting Nurse Feel This Could be a Complex Discharge?: No  Primary Care Physician: Dayan  Lives with - Patient's Self Care Capacity: Spouse  Patient or legal guardian wants to designate a caregiver (see row info): No  Support Systems: Family Member(s), Friends / Neighbors  Housing / Facility: [address]  Do You Take your Prescribed Medications Regularly: Yes  Able to Return to Previous ADL's: Yes  Mobility Issues: No  Prior Services: None  Patient Expects to be Discharged to:: home  Durable Medical Equipment: Not Applicable    Discharge Preparedness  What is your plan after discharge?: Uncertain - pending medical team collaboration  What are your discharge supports?: Spouse  Prior Functional Level: Ambulatory  Difficulity with ADLs: None    Functional Assesment  Prior Functional Level: Ambulatory    Finances  Financial Barriers to Discharge: Yes  Prescription Coverage: Yes    Vision / Hearing Impairment  Vision Impairment : No  Hearing Impairment : No    Values / Beliefs / Concerns  Values / Beliefs Concerns : No    Advance Directive  Advance Directive?: None  Advance Directive offered?: AD Booklet refused    Domestic Abuse  Have you ever been the victim of abuse or violence?: No  Physical Abuse or Sexual Abuse: No  Verbal Abuse or Emotional Abuse: No  Possible Abuse Reported to:: Not Applicable         Discharge Risks or Barriers  Discharge risks or barriers?: No    Anticipated Discharge Information  Anticipated discharge disposition: Home  Discharge Address: face sheet

## 2018-05-02 NOTE — ED TRIAGE NOTES
Chief Complaint   Patient presents with   • N/V     pt reports that symptoms started today at 0730 after eating in cafeteria here.   • Dizziness   • Weakness     Pt with vomiting x 2 since arrival. In obvious discomfort.  Blood pressure 129/75, pulse 82, temperature 37 °C (98.6 °F), resp. rate 16, weight 92.8 kg (204 lb 9.4 oz), SpO2 98 %.    Pt to R11

## 2018-05-02 NOTE — ASSESSMENT & PLAN NOTE
Concerning features of a vertigo included acute onset and severe symptoms with nausea vomiting  CT head is unrevealing  MRI brain to investigate for cerebellar infarct  PRN Meclizine

## 2018-05-03 ENCOUNTER — APPOINTMENT (OUTPATIENT)
Dept: RADIOLOGY | Facility: MEDICAL CENTER | Age: 53
End: 2018-05-03
Attending: HOSPITALIST
Payer: OTHER MISCELLANEOUS

## 2018-05-03 VITALS
OXYGEN SATURATION: 92 % | DIASTOLIC BLOOD PRESSURE: 70 MMHG | HEART RATE: 85 BPM | TEMPERATURE: 97.4 F | RESPIRATION RATE: 18 BRPM | WEIGHT: 205.69 LBS | BODY MASS INDEX: 31.28 KG/M2 | SYSTOLIC BLOOD PRESSURE: 109 MMHG

## 2018-05-03 PROBLEM — R42 VERTIGO: Status: RESOLVED | Noted: 2018-05-02 | Resolved: 2018-05-03

## 2018-05-03 PROCEDURE — 306588 SLEEVE,VASO CALF MED: Performed by: HOSPITALIST

## 2018-05-03 PROCEDURE — 97161 PT EVAL LOW COMPLEX 20 MIN: CPT

## 2018-05-03 PROCEDURE — G0378 HOSPITAL OBSERVATION PER HR: HCPCS

## 2018-05-03 PROCEDURE — 700102 HCHG RX REV CODE 250 W/ 637 OVERRIDE(OP): Performed by: HOSPITALIST

## 2018-05-03 PROCEDURE — 96375 TX/PRO/DX INJ NEW DRUG ADDON: CPT

## 2018-05-03 PROCEDURE — G8978 MOBILITY CURRENT STATUS: HCPCS | Mod: CI

## 2018-05-03 PROCEDURE — 70551 MRI BRAIN STEM W/O DYE: CPT

## 2018-05-03 PROCEDURE — A9270 NON-COVERED ITEM OR SERVICE: HCPCS | Performed by: HOSPITALIST

## 2018-05-03 PROCEDURE — 700111 HCHG RX REV CODE 636 W/ 250 OVERRIDE (IP): Performed by: NURSE PRACTITIONER

## 2018-05-03 PROCEDURE — G8980 MOBILITY D/C STATUS: HCPCS | Mod: CI

## 2018-05-03 PROCEDURE — G8979 MOBILITY GOAL STATUS: HCPCS | Mod: CI

## 2018-05-03 PROCEDURE — 99217 PR OBSERVATION CARE DISCHARGE: CPT | Performed by: HOSPITALIST

## 2018-05-03 RX ORDER — MECLIZINE HYDROCHLORIDE 25 MG/1
25 TABLET ORAL 3 TIMES DAILY PRN
Qty: 30 TAB | Refills: 0 | Status: SHIPPED | OUTPATIENT
Start: 2018-05-03 | End: 2023-06-14

## 2018-05-03 RX ORDER — MIDAZOLAM HYDROCHLORIDE 1 MG/ML
3 INJECTION INTRAMUSCULAR; INTRAVENOUS ONCE
Status: COMPLETED | OUTPATIENT
Start: 2018-05-03 | End: 2018-05-03

## 2018-05-03 RX ADMIN — MIDAZOLAM 3 MG: 1 INJECTION INTRAMUSCULAR; INTRAVENOUS at 08:26

## 2018-05-03 RX ADMIN — FLUTICASONE PROPIONATE 100 MCG: 50 SPRAY, METERED NASAL at 10:04

## 2018-05-03 RX ADMIN — OMEPRAZOLE 20 MG: 20 CAPSULE, DELAYED RELEASE ORAL at 10:04

## 2018-05-03 ASSESSMENT — COGNITIVE AND FUNCTIONAL STATUS - GENERAL
SUGGESTED CMS G CODE MODIFIER MOBILITY: CH
MOBILITY SCORE: 24

## 2018-05-03 ASSESSMENT — GAIT ASSESSMENTS
DISTANCE (FEET): 200
GAIT LEVEL OF ASSIST: INDEPENDENT

## 2018-05-03 ASSESSMENT — PAIN SCALES - GENERAL: PAINLEVEL_OUTOF10: 0

## 2018-05-03 NOTE — THERAPY
"Physical Therapy Evaluation completed.   Bed Mobility:  Supine to Sit: Independent  Transfers: Sit to Stand: Independent  Gait: Level Of Assist: Independent with No Equipment Needed       Plan of Care: Patient with no further skilled PT needs in the acute care setting at this time  Discharge Recommendations: Equipment: No Equipment Needed.     Pt presents with mild dizziness that did not increase with cervical rotation, cervical flexion/extension, or positional change. No nystagmus noted during [place] or an increase in symptoms. At this time, pt does not require further acute PT intervention and can follow up with his PCP as he desires.     See \"Rehab Therapy-Acute\" Patient Summary Report for complete documentation.     "

## 2018-05-03 NOTE — H&P
Hospital Medicine History and Physical    Date of Service  5/2/2018    Chief Complaint  Chief Complaint   Patient presents with   • N/V     pt reports that symptoms started today at 0730 after eating in cafeteria here.   • Dizziness   • Weakness       History of Presenting Illness  53 y.o. male who presented 5/2/2018 with dizziness.    Mr. Stoddard has a history of hyperlipidemia .  I persistent emergency room today with acute onset of dizziness. Patient was at doctor's appointment with his father, he says that he leaned up against a wall and subsequently had severe dizziness. The patient said is very difficult for him to stand up and walk she was so unsteady. He had a couple episodes of nonbloody emesis on his brought to the emergency room and evaluated for CT scan shows no acute abnormalities. Patient feels better with IV Zofran but vertigo persists no headache, no neck pain, no recent illnesses, no fevers or chills   Primary Care Physician  Cirilo Hanley A.P.N.    Consultants  None    Code Status  Full code    Review of Systems  Review of Systems   Constitutional: Negative for chills and malaise/fatigue.   Respiratory: Negative for cough, hemoptysis and sputum production.    Cardiovascular: Negative for chest pain, palpitations and orthopnea.   Gastrointestinal: Negative for nausea and vomiting.   Musculoskeletal: Negative for back pain and neck pain.   Skin: Negative for itching and rash.   Neurological: Positive for dizziness. Negative for headaches.   All other systems reviewed and are negative.       Past Medical History  Past Medical History:   Diagnosis Date   • Cancer (HCC)     basal cell , face   • Indigestion    • kidney stones        Surgical History  Past Surgical History:   Procedure Laterality Date   • GANGLION EXCISION Right 6/3/2016    Procedure: GANGLION EXCISION WRIST;  Surgeon: Aman Easton M.D.;  Location: SURGERY SAME DAY [facility];  Service:    • SEPTOTURBINOPLASTY  5/6/2010     Performed by ANURADHA JONAS at SURGERY SAME DAY [facility] ORS   • ETHMOIDECTOMY  2010    Performed by ANURADHA JONAS at SURGERY SAME DAY [facility] ORS   • ANTROSTOMY  2010    Performed by ANURADHA JONAS at SURGERY SAME DAY [facility] ORS   • OTHER      eus. tubes/adenoids   • OTHER ABDOMINAL SURGERY      vasectomy       Medications  No current facility-administered medications on file prior to encounter.      Current Outpatient Prescriptions on File Prior to Encounter   Medication Sig Dispense Refill   • simvastatin (ZOCOR) 20 MG Tab TAKE 1 TABLET BY MOUTH EVERY EVENING 90 Tab 0   • fluticasone (FLONASE) 50 MCG/ACT nasal spray Spray 2 Sprays in nose every day.     • Omega-3 Fatty Acids (FISH OIL) 1200 MG Cap Take 2 Capsule by mouth every day.     • Multiple Vitamins-Minerals (MENS MULTI VITAMIN & MINERAL) Tab Take 1 Tab by mouth every day.     • omeprazole (PRILOSEC OTC) 20 MG tablet Take 20 mg by mouth every day.         Family History  Family History   Problem Relation Age of Onset   • Cancer Mother      ovarian, breast   • Heart Disease Father    • Diabetes Father    • Diabetes Brother        Social History  Social History   Substance Use Topics   • Smoking status: Never Smoker   • Smokeless tobacco: Never Used   • Alcohol use 0.0 oz/week      Comment: 1 per month       Allergies  Allergies   Allergen Reactions   • Nkda [No Known Drug Allergy]         Physical Exam  Laboratory   Hemodynamics  Temp (24hrs), Av.7 °C (98.1 °F), Min:36.1 °C (97 °F), Max:37.2 °C (98.9 °F)   Temperature: 36.7 °C (98 °F)  Pulse  Av  Min: 79  Max: 94 Heart Rate (Monitored): 83  Blood Pressure: 112/72, NIBP: 128/81      Respiratory      Respiration: 16, Pulse Oximetry: 93 %        RUL Breath Sounds: Clear, RML Breath Sounds: Clear, RLL Breath Sounds: Clear, CR Breath Sounds: Clear, LLL Breath Sounds: Clear    Physical Exam   Constitutional: He is oriented to person, place, and time. He appears well-developed and  well-nourished.   Eyes: Conjunctivae and EOM are normal. Right eye exhibits no discharge. Left eye exhibits no discharge.   No nystagmus   Cardiovascular: Normal rate, regular rhythm and intact distal pulses.    No murmur heard.  Pulmonary/Chest: Effort normal and breath sounds normal. No respiratory distress. He has no wheezes.   Abdominal: Soft. Bowel sounds are normal. He exhibits no distension. There is no tenderness. There is no rebound.   Musculoskeletal: Normal range of motion. He exhibits no edema.   Neurological: He is alert and oriented to person, place, and time. No cranial nerve deficit.   Patient is able to sit up in bed and maintain his posture  5/5 strength in UE/LE bilaterally   Skin: Skin is warm and dry. He is not diaphoretic. No erythema.   Psychiatric: He has a normal mood and affect.       Recent Labs      05/02/18   1000   WBC  6.2   RBC  5.34   HEMOGLOBIN  14.8   HEMATOCRIT  43.5   MCV  81.5   MCH  27.7   MCHC  34.0   RDW  43.8   PLATELETCT  299   MPV  9.9     Recent Labs      05/02/18   1000   SODIUM  137   POTASSIUM  3.9   CHLORIDE  105   CO2  21   GLUCOSE  133*   BUN  14   CREATININE  1.01   CALCIUM  9.7     Recent Labs      05/02/18   1000   ALTSGPT  105*   ASTSGOT  56*   ALKPHOSPHAT  74   TBILIRUBIN  0.6   GLUCOSE  133*     Recent Labs      05/02/18   1000   APTT  22.9*   INR  1.01             Lab Results   Component Value Date    TROPONINI <0.01 05/02/2018     Urinalysis:    Lab Results  Component Value Date/Time   SPECGRAVITY 1.004 04/30/2010 1131   GLUCOSEUR Negative 04/30/2010 1131   KETONES Negative 04/30/2010 1131   NITRITE Negative 04/30/2010 1131        Imaging  CT Head:  1.  Head CT without contrast within normal limits. No evidence of acute cerebral infarction, hemorrhage or mass lesion.   Assessment/Plan     I anticipate this patient is appropriate for observation status at this time.    * Vertigo- (present on admission)   Assessment & Plan    Concerning features of a vertigo  included acute onset and severe symptoms with nausea vomiting  CT head is unrevealing  MRI brain to investigate for cerebellar infarct  PRN Meclizine         Hyperlipidemia- (present on admission)   Assessment & Plan    Continue outpatient statin            VTE prophylaxis: SCD's.

## 2018-05-03 NOTE — DISCHARGE SUMMARY
CHIEF COMPLAINT ON ADMISSION  Chief Complaint   Patient presents with   • N/V     pt reports that symptoms started today at 0730 after eating in cafeteria here.   • Dizziness   • Weakness       CODE STATUS  Full Code    HPI & HOSPITAL COURSE  This is a 53 y.o. male with no significant past medical history here with nausea, vomiting, dizziness and weakness. He presented to the emergency room yesterday with acute onset of dizziness. He was here visiting his father who was getting his pacemaker generator changed when he became dizzy and had to lean up against a wall. He has never experienced anything like this in the past. He has had no recent illnesses.  CBC and CMP were unremarkable. Hemoglobin A1c 6.0 with an estimated average glucose 126. Troponin less than 0.01. Twelve-lead EKG showed normal sinus rhythm with no ST elevations or depressions. Overnight telemetry showed no evidence of arrhythmias, tachycardias, bradycardias, or blocks.   CT of the head was unremarkable. Follow-up MRI was also unremarkable. He was evaluated by physical therapy who were unable to reproduce his dizziness and witnessed no nystagmus, therefore the Epley maneuver was not necessary. He is ambulatory independently without chest pain, short of breath, palpitations, dizziness or weakness. He is tolerating a diet without nausea or vomiting. He will be discharged with some meclizine and close follow-up with his PCP. He is instructed to call 911 or return to the emergency department for any emergencies.    Therefore, he is discharged in good and stable condition with close outpatient follow-up.    DISCHARGE PROBLEM LIST  Principal Problem (Resolved):    Vertigo POA: Yes  Active Problems:    Hyperlipidemia POA: Yes      FOLLOW UP  Message was left on the scheduling voicemail to make patient appointment with his PCP within 1 week.      MEDICATIONS ON DISCHARGE     Medication List      START taking these medications      Instructions   meclizine 25  MG Tabs  Commonly known as:  ANTIVERT   Take 1 Tab by mouth 3 times a day as needed for Dizziness.  Dose:  25 mg        CONTINUE taking these medications      Instructions   Fish Oil 1200 MG Caps   Take 2 Capsule by mouth every day.  Dose:  2 Capsule     fluticasone 50 MCG/ACT nasal spray  Commonly known as:  FLONASE   Spray 2 Sprays in nose every day.  Dose:  2 Spray     MENS MULTI VITAMIN & MINERAL Tabs   Take 1 Tab by mouth every day.  Dose:  1 Tab     NON SPECIFIED   Take 4 Tabs by mouth every day. HMR LIGNANS Patient uses for Hair loss and other male stuff  Dose:  4 Tab     PRILOSEC OTC 20 MG tablet  Generic drug:  omeprazole   Take 20 mg by mouth every day.  Dose:  20 mg     simvastatin 20 MG Tabs  Commonly known as:  ZOCOR   Doctor's comments:  Please remind pt to get labs done  TAKE 1 TABLET BY MOUTH EVERY EVENING          DIET  Orders Placed This Encounter   Procedures   • Diet Order     Standing Status:   Standing     Number of Occurrences:   1     Order Specific Question:   Diet:     Answer:   Regular [1]       ACTIVITY  As tolerated.  Weight bearing as tolerated      CONSULTATIONS  NA    PROCEDURES  NA    LABORATORY  Lab Results   Component Value Date/Time    SODIUM 137 05/02/2018 10:00 AM    POTASSIUM 3.9 05/02/2018 10:00 AM    CHLORIDE 105 05/02/2018 10:00 AM    CO2 21 05/02/2018 10:00 AM    GLUCOSE 133 (H) 05/02/2018 10:00 AM    BUN 14 05/02/2018 10:00 AM    CREATININE 1.01 05/02/2018 10:00 AM        Lab Results   Component Value Date/Time    WBC 6.2 05/02/2018 10:00 AM    HEMOGLOBIN 14.8 05/02/2018 10:00 AM    HEMATOCRIT 43.5 05/02/2018 10:00 AM    PLATELETCT 299 05/02/2018 10:00 AM        Total time of the discharge process exceeds 32 minutes   FRANKIE Sim.

## 2018-05-03 NOTE — PROGRESS NOTES
A/o, assessment completed,poc discussed,verbalized understanding,tolerating po well,denies pain,n/t,sob, still has little dizziness, SR on tele hr=91,call button within reach,will continue to monitor.

## 2018-05-03 NOTE — PROGRESS NOTES
Received report from evening RN.  Pt is A/Ox4.  Respirations are even and unlabored on RA.  Monitors applied, VSS.  Pt taken to MRI on mg and monitor.  This RN administered versed before MRI and accompanied pt to MRI, VSS continue to be stable.  Will continue to closely monitor.  Call light within reach

## 2018-05-14 NOTE — DISCHARGE INSTRUCTIONS
Consultation - Cardiology   Burnice [place] 54 y o  male MRN: [id number]  Unit/Bed#:  Encounter: [id number]    Inpatient consult to Cardiology  Consult performed by: Hayley Whelan ordered by: Elyssa Osman          History of Present Illness   Physician Requesting Consult: Juanito Peguero MD  Reason for Consult / Principal Problem:  Atrial fibrillation       Assessment:  1  Atrial fibrillation - presumably new onset - [id number]- VASc = 0  2  Tobacco abuse   3  Syncope  4  Incomplete right bundle branch block    Assessment/ Plan:  Mr Sanchez Massey is a pleasant 46-year-old gentleman with no prior cardiac history admitted to [address] [place] [address] with a syncopal episode  When EMS arrived he was found to have atrial fibrillation with a rapid ventricular response  With this he was asymptomatic  His rate control appears appropriate on his current AV shea blocking agent of metoprolol  He does have a low [id number] score  However given his young age I have recommended that he undergo a AGNIESZKA-guided cardioversion to which he is agreeable  This will be scheduled for sometime in the near future  He will need one month of anticoagulation and thereafter can resume aspirin  He was placed on Eliquis  I have requested a TSH  He has been told he snores at night and therefore he can undergo a home sleep study to rule out obstructive sleep apnea as a contributor to his atrial dysrhythmia  It is unclear if his atrial fibrillation was a contributor to his syncope  He may have had a post-conversion pause but was noted to be in atrial fibrillation upon EMS arrival   He denies any prior episodes  Given this and also to monitor his atrial fibrillation burden I would recommend implantation of a loop recorder which can be done as an outpatient  Further recommendations will be made as his case evolves       HPI: Burndelia Rayna is a 54y o  year old male with no prior cardiac history admitted Discharge Instructions    Discharged to home by car with friend. Discharged via walking, hospital escort: Yes.  Special equipment needed: Not Applicable    Be sure to schedule a follow-up appointment with your primary care doctor or any specialists as instructed.     Discharge Plan:   Diet Plan: Discussed  Activity Level: Discussed  Confirmed Follow up Appointment: Patient to Call and Schedule Appointment  Confirmed Symptoms Management: Discussed  Medication Reconciliation Updated: Yes  Influenza Vaccine Indication: Not indicated: Previously immunized this influenza season and > 8 years of age    I understand that a diet low in cholesterol, fat, and sodium is recommended for good health. Unless I have been given specific instructions below for another diet, I accept this instruction as my diet prescription.   Other diet:     Special Instructions: None    · Is patient discharged on Warfarin / Coumadin?   No     Depression / Suicide Risk    As you are discharged from this [facility] facility, it is important to learn how to keep safe from harming yourself.    Recognize the warning signs:  · Abrupt changes in personality, positive or negative- including increase in energy   · Giving away possessions  · Change in eating patterns- significant weight changes-  positive or negative  · Change in sleeping patterns- unable to sleep or sleeping all the time   · Unwillingness or inability to communicate  · Depression  · Unusual sadness, discouragement and loneliness  · Talk of wanting to die  · Neglect of personal appearance   · Rebelliousness- reckless behavior  · Withdrawal from people/activities they love  · Confusion- inability to concentrate     If you or a loved one observes any of these behaviors or has concerns about self-harm, here's what you can do:  · Talk about it- your feelings and reasons for harming yourself  · Remove any means that you might use to hurt yourself (examples: pills, rope, extension cords,  firearm)  · Get professional help from the community (Mental Health, Substance Abuse, psychological counseling)  · Do not be alone:Call your Safe Contact- someone whom you trust who will be there for you.  · Call your local CRISIS HOTLINE 651-6955 or 086-791-4374  · Call your local Children's Mobile Crisis Response Team [place] (082) 598-4967 or www.PulseOn  · Call the toll free National Suicide Prevention Hotlines   · National Suicide Prevention Lifeline 866-159-ZRCW (6652)  · [employer] Hope Line Network 800-SUICIDE (413-3647)      Dizziness  Dizziness is a common problem. It is a feeling of unsteadiness or light-headedness. You may feel like you are about to faint. Dizziness can lead to injury if you stumble or fall. Anyone can become dizzy, but dizziness is more common in older adults. This condition can be caused by a number of things, including medicines, dehydration, or illness.  Follow these instructions at home:  Taking these steps may help with your condition:  Eating and drinking  · Drink enough fluid to keep your urine clear or pale yellow. This helps to keep you from becoming dehydrated. Try to drink more clear fluids, such as water.  · Do not drink alcohol.  · Limit your caffeine intake if directed by your health care provider.  · Limit your salt intake if directed by your health care provider.  Activity  · Avoid making quick movements.  ¨ Rise slowly from chairs and steady yourself until you feel okay.  ¨ In the morning, first sit up on the side of the bed. When you feel okay, stand slowly while you hold onto something until you know that your balance is fine.  · Move your legs often if you need to  one place for a long time. Tighten and relax your muscles in your legs while you are standing.  · Do not drive or operate heavy machinery if you feel dizzy.  · Avoid bending down if you feel dizzy. Place items in your home so that they are easy for you to reach without leaning  to [address] [place] [address] with a syncopal episode  He states that he had worked a normal work day as an   He came home and mowed his lawn and did other yard work  He was walking to get a hose to wash off his lawn equipment and the next thing he remembers is waking up in the ambulance  He had no preceding symptoms  He denies any prior syncope  Upon EMS arrival he was noted to be in atrial fibrillation with a rapid ventricular response  He was admitted for further evaluation and management  He was initially placed on an IV Cardizem drip and transitioned to an oral regimen of metoprolol  He has no prior cardiac history  He is active at his job and in his yard  With the activity he performs he denies any exertional chest pain or shortness of breath  He denies symptoms of heart failure including increasing lower extremity edema, paroxysmal nocturnal dyspnea, orthopnea, acute weight gain or increasing abdominal girth  He denies any prior syncope or near-syncope  He denies any sensation of palpitations or irregular heartbeat  He denies any symptoms of claudication  He has a history of tobacco abuse and smokes 10 to 15 cigarettes per day  He states his wife states he snores at night  He denies any morning headaches or waking feeling unrefreshed  He does not think there have been any witnessed apneas  ROS:  Positive as per the HPI, all other systems were reviewed and were negative     Historical Information   Past Medical History:   Diagnosis Date    H/O von Willebrand's disease     Migraines     Wrist fracture     left     Past Surgical History:   Procedure Laterality Date    WISDOM TOOTH EXTRACTION       History   Alcohol Use No     History   Drug Use No     History   Smoking Status    Current Every Day Smoker    Packs/day: 1 00    Types: Cigarettes   Smokeless Tobacco    Never Used     Family History: non-contributory    Meds/Allergies   all current active meds have been reviewed       No Known Allergies    Objective   Vitals: Blood pressure 130/80, pulse 96, temperature 98 1 °F (36 7 °C), temperature source Temporal, resp  rate 17, height 5' 11" (1 803 m), weight 70 7 kg (155 lb 13 8 oz), SpO2 98 %  , Body mass index is 21 74 kg/m² , Orthostatic Blood Pressures      Most Recent Value   Blood Pressure  130/80 filed at 05/14/2018 8084   Patient Position - Orthostatic VS  Lying filed at 05/14/2018 1214        Systolic (23LUL), DMR:800 , Min:102 , SIX:273     Diastolic (71LNW), KVP:87, Min:68, Max:106      Intake/Output Summary (Last 24 hours) at 05/14/18 0920  Last data filed at 05/14/18 0844   Gross per 24 hour   Intake             1160 ml   Output             3200 ml   Net            -2040 ml       Weight (last 2 days)     Date/Time   Weight    05/14/18 0600  70 7 (155 87)    05/14/18 0525  70 7 (155 87)    05/13/18 0533  74 3 (163 8)    05/12/18 0530  71 1 (156 75)              Invasive Devices     Peripheral Intravenous Line            Peripheral IV 05/11/18 Right Antecubital 2 days                    Physical Exam: /80   Pulse 96   Temp 98 1 °F (36 7 °C) (Temporal)   Resp 17   Ht 5' 11" (1 803 m)   Wt 70 7 kg (155 lb 13 8 oz)   SpO2 98%   BMI 21 74 kg/m²     General Appearance:    Alert, cooperative, no distress, appears stated age   Head:    Normocephalic, without obvious abnormality, atraumatic   Nose:   Nares normal, septum midline, mucosa normal, no drainage    or sinus tenderness   Throat:   Lips, mucosa, and tongue normal; teeth and gums normal   Neck:   Supple, symmetrical, trachea midline, no adenopathy;        thyroid:  No enlargement/tenderness/nodules; no carotid    bruit or JVD   Back:     Symmetric, no curvature, ROM normal, no CVA tenderness   Lungs:     Clear to auscultation bilaterally, respirations unlabored   Chest wall:    No tenderness or deformity   Heart:    Irregularly irregular rate and rhythm, variable S1/S2, no         murmur over.  Lifestyle  · Do not use any tobacco products, including cigarettes, chewing tobacco, or electronic cigarettes. If you need help quitting, ask your health care provider.  · Try to reduce your stress level, such as with yoga or meditation. Talk with your health care provider if you need help.  General instructions  · Watch your dizziness for any changes.  · Take medicines only as directed by your health care provider. Talk with your health care provider if you think that your dizziness is caused by a medicine that you are taking.  · Tell a friend or a family member that you are feeling dizzy. If he or she notices any changes in your behavior, have this person call your health care provider.  · Keep all follow-up visits as directed by your health care provider. This is important.  Contact a health care provider if:  · Your dizziness does not go away.  · Your dizziness or light-headedness gets worse.  · You feel nauseous.  · You have reduced hearing.  · You have new symptoms.  · You are unsteady on your feet or you feel like the room is spinning.  Get help right away if:  · You vomit or have diarrhea and are unable to eat or drink anything.  · You have problems talking, walking, swallowing, or using your arms, hands, or legs.  · You feel generally weak.  · You are not thinking clearly or you have trouble forming sentences. It may take a friend or family member to notice this.  · You have chest pain, abdominal pain, shortness of breath, or sweating.  · Your vision changes.  · You notice any bleeding.  · You have a headache.  · You have neck pain or a stiff neck.  · You have a fever.  This information is not intended to replace advice given to you by your health care provider. Make sure you discuss any questions you have with your health care provider.  Document Released: 06/13/2002 Document Revised: 05/25/2017 Document Reviewed: 12/14/2015  Elsevier Interactive Patient Education © 2017 [employer]     Abdomen:     Soft, non-tender, bowel sounds active all four quadrants,     no masses, no organomegaly   Extremities:   Extremities normal, atraumatic, no cyanosis or edema   Pulses:   2+ and symmetric all extremities   Skin:   Mutliple ecchymotic areas over his right face and chin   Neurologic:   CNII-XII intact  Normal strength, sensation and reflexes       throughout           Laboratory Results:    Results from last 7 days  Lab Units 05/11/18  1759   TROPONIN I ng/mL <0 02       CBC with diff:   Results from last 7 days  Lab Units 05/14/18  0519 05/13/18  0530 05/12/18  0508   WBC Thousand/uL 8 15 7 17 9 12   HEMOGLOBIN g/dL 14 6 13 4 13 5   HEMATOCRIT % 40 8 37 8 37 8   MCV fL 86 88 87   PLATELETS Thousands/uL 211 176 194   MCH pg 30 8 31 3 31 2   MCHC g/dL 35 8 35 4 35 7   RDW % 11 7 12 1 12 1   MPV fL 9 6 9 6 9 6         CMP:  Results from last 7 days  Lab Units 05/13/18  0530 05/12/18  0508 05/11/18  1759   SODIUM mmol/L 140 141 139   POTASSIUM mmol/L 4 4 4 1 2 9*   CHLORIDE mmol/L 108 108 102   CO2 mmol/L 25 24 23   ANION GAP mmol/L 7 9 14*   BUN mg/dL 10 11 12   CREATININE mg/dL 0 78 0 83 1 07   GLUCOSE RANDOM mg/dL 94 117 182*   CALCIUM mg/dL 7 6* 8 1* 8 7   AST U/L  --  15  --    ALT U/L  --  25  --    ALK PHOS U/L  --  75  --    TOTAL PROTEIN g/dL  --  5 7*  --    BILIRUBIN TOTAL mg/dL  --  0 30  --    EGFR ml/min/1 73sq m 107 105 82         BMP:  Results from last 7 days  Lab Units 05/13/18  0530 05/12/18  0508 05/11/18  1759   SODIUM mmol/L 140 141 139   POTASSIUM mmol/L 4 4 4 1 2 9*   CHLORIDE mmol/L 108 108 102   CO2 mmol/L 25 24 23   BUN mg/dL 10 11 12   CREATININE mg/dL 0 78 0 83 1 07   GLUCOSE RANDOM mg/dL 94 117 182*   CALCIUM mg/dL 7 6* 8 1* 8 7       BNP:  No results for input(s): BNP in the last 72 hours      Magnesium:   Results from last 7 days  Lab Units 05/12/18  0508 05/11/18  1842   MAGNESIUM mg/dL 1 9 1 9       Coags:   Results from last 7 days  Lab Units 05/12/18  0508 05/11/18  1759 PTT seconds 28 26   INR  1 10 1 06       Hemoglobin A1C   Results from last 7 days  Lab Units 05/12/18  0508   HEMOGLOBIN A1C % 5 5             Imaging: I have personally reviewed pertinent reports  Cta Head And Neck W Wo Contrast    Result Date: 5/13/2018  Narrative: CTA NECK AND BRAIN WITH AND WITHOUT CONTRAST INDICATION: syncope and collapse COMPARISON:   5/11/2018 head CT TECHNIQUE:  Routine CT imaging of the Brain without contrast   Post contrast imaging was performed after administration of iodinated contrast through the neck and brain  Post contrast axial 0 625 mm images timed to opacify the arterial system  3D rendering was performed on an independent workstation  MIP reconstructions performed  Coronal reconstructions were performed of the noncontrast portion of the brain  Radiation dose length product (DLP) for this visit:  5514 mGy-cm   This examination, like all CT scans performed in the [facility], was performed utilizing techniques to minimize radiation dose exposure, including the use of iterative reconstruction and automated exposure control  IV Contrast:  100 mL of iohexol (OMNIPAQUE)  IMAGE QUALITY:   Diagnostic FINDINGS: NONCONTRAST BRAIN PARENCHYMA:  No intracranial mass, mass effect or midline shift  No acute intracranial hemorrhage  No CT signs of acute infarction  VENTRICLES AND EXTRA-AXIAL SPACES:  Normal for patient's age  VISUALIZED ORBITS AND PARANASAL SINUSES:  Unremarkable  CALVARIUM AND EXTRACRANIAL SOFT TISSUES:   Normal  CERVICAL VASCULATURE AORTIC ARCH AND GREAT VESSELS:  Normal aortic arch and great vessel origins  Normal visualized subclavian vessels  RIGHT VERTEBRAL ARTERY CERVICAL SEGMENT:  Normal origin  The vessel is normal in caliber throughout the neck  LEFT VERTEBRAL ARTERY CERVICAL SEGMENT:  Normal origin  The vessel is normal in caliber throughout the neck  RIGHT EXTRACRANIAL CAROTID SEGMENT:  Normal caliber common carotid artery    Normal bifurcation and cervical internal carotid artery  No stenosis or dissection  LEFT EXTRACRANIAL CAROTID SEGMENT:  Normal caliber common carotid artery  Normal bifurcation and cervical internal carotid artery  No stenosis or dissection  NASCET criteria was used to determine the degree of internal carotid artery diameter stenosis  INTRACRANIAL VASCULATURE INTERNAL CAROTID ARTERIES:  Normal enhancement of the intracranial portions of the internal carotid arteries  Normal ophthalmic artery origins  Normal ICA terminus  ANTERIOR CIRCULATION:  Symmetric A1 segments and anterior cerebral arteries with normal enhancement  Normal anterior communicating artery  MIDDLE CEREBRAL ARTERY CIRCULATION:  M1 segment and middle cerebral artery branches demonstrate normal enhancement bilaterally  DISTAL VERTEBRAL ARTERIES:  Normal distal vertebral arteries  Posterior inferior cerebellar artery origins are normal  Normal vertebral basilar junction  BASILAR ARTERY:  Basilar artery is normal in caliber  Normal superior cerebellar arteries  POSTERIOR CEREBRAL ARTERIES: Both posterior cerebral arteries arises from the basilar tip  Both arteries demonstrate normal enhancement  Normal posterior communicating arteries  DURAL VENOUS SINUSES:  Normal  NON VASCULAR ANATOMY BONY STRUCTURES:  No acute osseous abnormality  SOFT TISSUES OF THE NECK:  Unremarkable  THORACIC INLET:  Unremarkable  Impression: No significant brain parenchymal abnormalities, consistent with prior study No acute intracranial hemorrhage or mass effect No evidence of significant stenosis, dissection or occlusion involving cervical carotid or vertebral segments or visualized cerebral arteries Workstation performed: [id number]     Xr Neck Soft Tissue    Result Date: 5/13/2018  Narrative: NECK  SOFT TISSUE EXAMINATION INDICATION:   Foreign body   COMPARISON:  None VIEWS:  XR NECK SOFT TISSUE FINDINGS: The epiglottis and aryepiglottic folds are unremarkable in appearance  No radiopaque foreign bodies are appreciated  There are no osseous abnormalities  Impression: Unremarkable study  Workstation performed: [id number]     Xr Chest 1 View Portable    Result Date: 5/11/2018  Narrative: CHEST INDICATION:   Trauma  COMPARISON:  None EXAM PERFORMED/VIEWS:  XR CHEST PORTABLE  AP supine portable FINDINGS: Questionable superior mediastinal widening which could be from brachiocephalic vessels/positioning, follow-up with repeat PA and lateral chest x-rays  The lungs are clear  No pleural effusion  Osseous structures appear within normal limits for patient age  Impression: Questionable superior mediastinal widening which could be from brachiocephalic vessels/positioning, follow-up with repeat PA and lateral chest x-rays  Workstation performed: [id number]     Xr Chest 2 Views    Result Date: 5/11/2018  Narrative: CHEST INDICATION: Trauma, follow-up COMPARISON:  Previous exam same date EXAM PERFORMED/VIEWS:  XR CHEST PA & LATERAL FINDINGS: Persistent somewhat oblique right paratracheal density possibly representing brachiocephalic vessels  Adenopathy could appear similarly  Vascular injury considered less likely  Normal cardiac silhouette  The lungs are clear  No pneumothorax or pleural effusion  Osseous structures appear within normal limits for patient age  Impression: Persistent right paratracheal density suspicious for brachiocephalic vessels or adenopathy  Vascular injury considered less likely Workstation performed: [id number]     Xr Shoulder 2+ Views Left    Result Date: 5/11/2018  Narrative: LEFT SHOULDER INDICATION:   trauma  COMPARISON:  None VIEWS:  XR SHOULDER 2+ VW LEFT FINDINGS: There is no acute fracture or dislocation  No significant degenerative changes  No lytic or blastic lesions are seen  Soft tissues are unremarkable  Impression: No acute osseous abnormality   Workstation performed: [id number]     Ct Head Wo Contrast    Result Date: 5/11/2018  Narrative: CT BRAIN - WITHOUT CONTRAST INDICATION:   trauma  COMPARISON:  None  TECHNIQUE:  CT examination of the brain was performed  In addition to axial images, coronal 2D reformatted images were created and submitted for interpretation  Radiation dose length product (DLP) for this visit:  1077 mGy-cm   This examination, like all CT scans performed in the [facility], was performed utilizing techniques to minimize radiation dose exposure, including the use of iterative reconstruction and automated exposure control  IMAGE QUALITY:  Diagnostic  FINDINGS: PARENCHYMA:  No intracranial mass, mass effect or midline shift  No CT signs of acute infarction  No acute intracranial hemorrhage  VENTRICLES AND EXTRA-AXIAL SPACES:  Normal for the patient's age  VISUALIZED ORBITS AND PARANASAL SINUSES:  Unremarkable  CALVARIUM AND EXTRACRANIAL SOFT TISSUES:  Normal      Impression: No acute intracranial abnormality  Workstation performed: [id number]     Ct Facial Bones Wo Contrast    Result Date: 5/11/2018  Narrative: CT FACIAL BONES WITHOUT INTRAVENOUS CONTRAST INDICATION:   facial trauma  COMPARISON: None  TECHNIQUE:  Axial CT images were obtained through the facial bones with additional sagittal and coronal reconstructions  Radiation dose length product (DLP) for this visit:  358 mGy-cm   This examination, like all CT scans performed in the [facility], was performed utilizing techniques to minimize radiation dose exposure, including the use of iterative reconstruction and automated exposure control  IMAGE QUALITY:  Diagnostic  FINDINGS: FACIAL BONES:   No facial bone fracture identified  Normal alignment of the temporomandibular joints  No lytic or blastic lesion  ORBITS:  Orbital globes, optic nerves, and extraocular muscles appear symmetric and normal  There is no evidence of retrobulbar mass, abscess, or hematoma   SINUSES:  Normal  SOFT TISSUES:  There is soft tissue swelling at the anterior right mandibular region  Impression: No facial bone fracture or subluxation  Workstation performed: [id number]     Ct Spine Cervical Wo Contrast    Result Date: 5/11/2018  Narrative: CT CERVICAL SPINE - WITHOUT CONTRAST INDICATION:   trauma  COMPARISON: None  TECHNIQUE:  CT examination of the cervical spine was performed without intravenous contrast   Contiguous axial images were obtained  Sagittal and coronal reconstructions were performed  Radiation dose length product (DLP) for this visit:  529 mGy-cm   This examination, like all CT scans performed in the [facility], was performed utilizing techniques to minimize radiation dose exposure, including the use of iterative reconstruction and automated exposure control  IMAGE QUALITY:  Diagnostic  FINDINGS: ALIGNMENT:  Normal alignment of the cervical spine  No subluxation  VERTEBRAL BODIES:  No fracture  There are subcentimeter sclerotic densities noted at the left aspect of C1 and C4 likely representing bone islands  DEGENERATIVE CHANGES:  No significant cervical degenerative changes are noted  PREVERTEBRAL AND PARASPINAL SOFT TISSUES:  There is inspissated material noted at the dependent portion of the hypopharynx with an approximately 7 x 3 mm metallic density noted at the posterior midline the level of the piriform sinuses  THORACIC INLET:  Normal      Impression: No cervical spine fracture or traumatic malalignment  Subcentimeter metallic density noted within the dependent portion of the hypopharynx at the level of the piriform sinuses  The findings may be related to an aspirated dental amalgam   ENT consultation is suggested  I personally discussed this study with Marino Cheng 5/11/2018 at 7:02 PM   Workstation performed: [id number]     Cta Dissection Protocol Chest And Abdomen    Result Date: 5/11/2018  Narrative: CTA - CHEST AND ABDOMEN  - WITHOUT AND WITH IV CONTRAST INDICATION:   Chest pain  COMPARISON:  None  TECHNIQUE: CT examination of the chest and abdomen was performed both prior to and after the administration of intravenous contrast   Thin section angiographic arterial phase post contrast technique was used in order to evaluate for aortic dissection  3D reformatted images and volume rendering were performed on an independent workstation  Additionally, axial, sagittal, and coronal 2D reformatted images were created from the source data and submitted for interpretation  Radiation dose length product (DLP) for this visit:  1033 mGy-cm   This examination, like all CT scans performed in the [facility], was performed utilizing techniques to minimize radiation dose exposure, including the use of iterative reconstruction and automated exposure control  IV Contrast:  100 mL of iohexol (OMNIPAQUE) Enteric Contrast: Enteric contrast was not administered  FINDINGS: AORTA: There is no aortic dissection or intramural hematoma  There is no aortic aneurysm  There is a prominent ductus bump/outpouching at the ligamentum arteriosum, likely congenital   Superior indentation on the celiac axis best seen on the sagittal view could relate to median arcuate ligament syndrome  CHEST LUNGS:  Bibasilar subsegmental atelectasis  PLEURA:  Unremarkable  HEART/PULMONARY ARTERIAL TREE:  Heart is unremarkable for the patient's age  Within the limitations of this examination there is no evidence of pulmonary embolus  MEDIASTINUM AND HORACIO:  Unremarkable  CHEST WALL AND LOWER NECK:   Unremarkable  ABDOMEN LIVER/BILIARY TREE:  Mild hepatic steatosis and hepatomegaly  GALLBLADDER:  No calcified gallstones  No pericholecystic inflammatory change  SPLEEN:  Unremarkable  PANCREAS:  Unremarkable  ADRENAL GLANDS:  Unremarkable  KIDNEYS/URETERS:  Unremarkable  No hydronephrosis  VISUALIZED STOMACH AND BOWEL:  Mildly thickened proximal transverse colon likely from underdistention rather than colitis    VISUALIZED ABDOMINOPELVIC CAVITY:  No ascites or free intraperitoneal air  No lymphadenopathy  ABDOMINAL WALL:  Unremarkable  OSSEOUS STRUCTURES:  No acute fracture or destructive osseous lesion  Impression: 1  No evidence of aneurysm or dissection  2   Clear lungs  3   Mild hepatic steatosis and hepatomegaly  4   Superior indentation on the celiac axis best seen on the sagittal view could relate to median arcuate ligament syndrome  Workstation performed: [id number]       EKG reviewed personally:   Atrial fibrillation, incomplete right bundle-branch block      Counseling / Coordination of Care  Total floor / unit time spent today 60 minutes  Greater than 50% of total time was spent with the patient and / or family counseling and / or coordination of care          Code Status: Level 1 - Full Code

## 2023-01-16 ENCOUNTER — HOSPITAL ENCOUNTER (OUTPATIENT)
Dept: LAB | Facility: MEDICAL CENTER | Age: 58
End: 2023-01-16
Attending: NURSE PRACTITIONER
Payer: COMMERCIAL

## 2023-01-16 LAB
25(OH)D3 SERPL-MCNC: 24 NG/ML (ref 30–100)
ALBUMIN SERPL BCP-MCNC: 4.5 G/DL (ref 3.2–4.9)
ALBUMIN/GLOB SERPL: 1.8 G/DL
ALP SERPL-CCNC: 61 U/L (ref 30–99)
ALT SERPL-CCNC: 108 U/L (ref 2–50)
ANION GAP SERPL CALC-SCNC: 11 MMOL/L (ref 7–16)
AST SERPL-CCNC: 58 U/L (ref 12–45)
BASOPHILS # BLD AUTO: 1.1 % (ref 0–1.8)
BASOPHILS # BLD: 0.07 K/UL (ref 0–0.12)
BILIRUB SERPL-MCNC: 0.6 MG/DL (ref 0.1–1.5)
BUN SERPL-MCNC: 15 MG/DL (ref 8–22)
CALCIUM ALBUM COR SERPL-MCNC: 9.3 MG/DL (ref 8.5–10.5)
CALCIUM SERPL-MCNC: 9.7 MG/DL (ref 8.5–10.5)
CHLORIDE SERPL-SCNC: 107 MMOL/L (ref 96–112)
CHOLEST SERPL-MCNC: 205 MG/DL (ref 100–199)
CO2 SERPL-SCNC: 27 MMOL/L (ref 20–33)
CREAT SERPL-MCNC: 1.09 MG/DL (ref 0.5–1.4)
EOSINOPHIL # BLD AUTO: 0.29 K/UL (ref 0–0.51)
EOSINOPHIL NFR BLD: 4.6 % (ref 0–6.9)
ERYTHROCYTE [DISTWIDTH] IN BLOOD BY AUTOMATED COUNT: 41.1 FL (ref 35.9–50)
EST. AVERAGE GLUCOSE BLD GHB EST-MCNC: 117 MG/DL
FASTING STATUS PATIENT QL REPORTED: NORMAL
GFR SERPLBLD CREATININE-BSD FMLA CKD-EPI: 80 ML/MIN/1.73 M 2
GLOBULIN SER CALC-MCNC: 2.5 G/DL (ref 1.9–3.5)
GLUCOSE SERPL-MCNC: 99 MG/DL (ref 65–99)
HBA1C MFR BLD: 5.7 % (ref 4–5.6)
HCT VFR BLD AUTO: 48.6 % (ref 42–52)
HDLC SERPL-MCNC: 35 MG/DL
HGB BLD-MCNC: 16.9 G/DL (ref 14–18)
IMM GRANULOCYTES # BLD AUTO: 0.03 K/UL (ref 0–0.11)
IMM GRANULOCYTES NFR BLD AUTO: 0.5 % (ref 0–0.9)
LDLC SERPL CALC-MCNC: 132 MG/DL
LYMPHOCYTES # BLD AUTO: 1.97 K/UL (ref 1–4.8)
LYMPHOCYTES NFR BLD: 30.9 % (ref 22–41)
MCH RBC QN AUTO: 31 PG (ref 27–33)
MCHC RBC AUTO-ENTMCNC: 34.8 G/DL (ref 33.7–35.3)
MCV RBC AUTO: 89 FL (ref 81.4–97.8)
MONOCYTES # BLD AUTO: 0.75 K/UL (ref 0–0.85)
MONOCYTES NFR BLD AUTO: 11.8 % (ref 0–13.4)
NEUTROPHILS # BLD AUTO: 3.26 K/UL (ref 1.82–7.42)
NEUTROPHILS NFR BLD: 51.1 % (ref 44–72)
NRBC # BLD AUTO: 0 K/UL
NRBC BLD-RTO: 0 /100 WBC
PLATELET # BLD AUTO: 268 K/UL (ref 164–446)
PMV BLD AUTO: 10 FL (ref 9–12.9)
POTASSIUM SERPL-SCNC: 4.4 MMOL/L (ref 3.6–5.5)
PROT SERPL-MCNC: 7 G/DL (ref 6–8.2)
PSA SERPL-MCNC: 0.22 NG/ML (ref 0–4)
RBC # BLD AUTO: 5.46 M/UL (ref 4.7–6.1)
SODIUM SERPL-SCNC: 145 MMOL/L (ref 135–145)
T3 SERPL-MCNC: 126 NG/DL (ref 60–181)
T4 FREE SERPL-MCNC: 1.08 NG/DL (ref 0.93–1.7)
TRIGL SERPL-MCNC: 192 MG/DL (ref 0–149)
TSH SERPL DL<=0.005 MIU/L-ACNC: 2.23 UIU/ML (ref 0.38–5.33)
WBC # BLD AUTO: 6.4 K/UL (ref 4.8–10.8)

## 2023-01-16 PROCEDURE — 84402 ASSAY OF FREE TESTOSTERONE: CPT

## 2023-01-16 PROCEDURE — 80053 COMPREHEN METABOLIC PANEL: CPT

## 2023-01-16 PROCEDURE — 36415 COLL VENOUS BLD VENIPUNCTURE: CPT

## 2023-01-16 PROCEDURE — 80061 LIPID PANEL: CPT

## 2023-01-16 PROCEDURE — 84439 ASSAY OF FREE THYROXINE: CPT

## 2023-01-16 PROCEDURE — 84403 ASSAY OF TOTAL TESTOSTERONE: CPT

## 2023-01-16 PROCEDURE — 84480 ASSAY TRIIODOTHYRONINE (T3): CPT

## 2023-01-16 PROCEDURE — 82306 VITAMIN D 25 HYDROXY: CPT

## 2023-01-16 PROCEDURE — 83036 HEMOGLOBIN GLYCOSYLATED A1C: CPT

## 2023-01-16 PROCEDURE — 85025 COMPLETE CBC W/AUTO DIFF WBC: CPT

## 2023-01-16 PROCEDURE — 84153 ASSAY OF PSA TOTAL: CPT

## 2023-01-16 PROCEDURE — 84270 ASSAY OF SEX HORMONE GLOBUL: CPT

## 2023-01-16 PROCEDURE — 84443 ASSAY THYROID STIM HORMONE: CPT

## 2023-01-18 LAB
SHBG SERPL-SCNC: 50 NMOL/L (ref 19–76)
TESTOST FREE MFR SERPL: 1.5 % (ref 1.6–2.9)
TESTOST FREE SERPL-MCNC: 74 PG/ML (ref 47–244)
TESTOST SERPL-MCNC: 503 NG/DL (ref 300–890)

## 2023-06-14 ENCOUNTER — OFFICE VISIT (OUTPATIENT)
Dept: URGENT CARE | Facility: PHYSICIAN GROUP | Age: 58
End: 2023-06-14
Payer: COMMERCIAL

## 2023-06-14 ENCOUNTER — HOSPITAL ENCOUNTER (OUTPATIENT)
Dept: RADIOLOGY | Facility: MEDICAL CENTER | Age: 58
End: 2023-06-14
Attending: REGISTERED NURSE
Payer: COMMERCIAL

## 2023-06-14 VITALS
TEMPERATURE: 98 F | SYSTOLIC BLOOD PRESSURE: 128 MMHG | DIASTOLIC BLOOD PRESSURE: 84 MMHG | WEIGHT: 220 LBS | HEIGHT: 68 IN | OXYGEN SATURATION: 95 % | BODY MASS INDEX: 33.34 KG/M2 | RESPIRATION RATE: 18 BRPM | HEART RATE: 90 BPM

## 2023-06-14 DIAGNOSIS — R07.9 CHEST PAIN, UNSPECIFIED TYPE: ICD-10-CM

## 2023-06-14 DIAGNOSIS — R07.89 COSTOCHONDRAL CHEST PAIN: Primary | ICD-10-CM

## 2023-06-14 PROCEDURE — 3074F SYST BP LT 130 MM HG: CPT | Performed by: REGISTERED NURSE

## 2023-06-14 PROCEDURE — 3079F DIAST BP 80-89 MM HG: CPT | Performed by: REGISTERED NURSE

## 2023-06-14 PROCEDURE — 99214 OFFICE O/P EST MOD 30 MIN: CPT | Performed by: REGISTERED NURSE

## 2023-06-14 PROCEDURE — 71046 X-RAY EXAM CHEST 2 VIEWS: CPT

## 2023-06-14 ASSESSMENT — ENCOUNTER SYMPTOMS
DIZZINESS: 0
ORTHOPNEA: 0
CHILLS: 0
MYALGIAS: 0
NAUSEA: 0
COUGH: 0
FEVER: 0
DIARRHEA: 0
EYE PAIN: 0
ABDOMINAL PAIN: 0
SHORTNESS OF BREATH: 0
PALPITATIONS: 0
HEADACHES: 0
VOMITING: 0

## 2023-06-14 ASSESSMENT — FIBROSIS 4 INDEX: FIB4 SCORE: 1.17

## 2023-06-14 NOTE — PROGRESS NOTES
Subjective:   Kevin Stoddard is a 58 y.o. male who presents for Other (X 4 days, Feels like he pulled a muscle in chest, px comes and goes and is while certain motion, tender to the touch )    Pain in the left chest started 4 days ago, its localized to left of sternum. There is no radiation of the pain. Turning or palpation reproduces pain. Occasionally a deep breath will cause pain in same area. Is not currently having any chest pain or symptoms. No medications or treatments tried. No recent illness. No underlying heart or lung issues. Familial hx of heart attack in father in 60s.     Review of Systems   Constitutional:  Negative for chills and fever.   Eyes:  Negative for pain.   Respiratory:  Negative for cough and shortness of breath.    Cardiovascular:  Positive for chest pain (left sternal). Negative for palpitations, orthopnea and leg swelling.   Gastrointestinal:  Negative for abdominal pain, diarrhea, nausea and vomiting.   Musculoskeletal:  Negative for myalgias.   Skin:  Negative for rash.   Neurological:  Negative for dizziness and headaches.     Allergies   Allergen Reactions    Nkda [No Known Drug Allergy]        Patient Active Problem List    Diagnosis Date Noted    Encounter to establish care with new doctor 04/17/2017    Skin cancer screening 04/17/2017    Colon cancer screening 04/17/2017    BMI 32.0-32.9,adult 04/17/2017    Gastroesophageal reflux disease without esophagitis 03/04/2016    Hyperlipidemia 11/06/2015    Elevated fasting glucose 11/06/2015    Left foot pain 11/06/2015    Ganglion of right wrist 11/06/2015       Current Outpatient Medications Ordered in Epic   Medication Sig Dispense Refill    NON SPECIFIED Take 4 Tabs by mouth every day. HMR LIGNANS  Patient uses for Hair loss and other male stuff      Omega-3 Fatty Acids (FISH OIL) 1200 MG Cap Take 2 Capsule by mouth every day.      Multiple Vitamins-Minerals (MENS MULTI VITAMIN & MINERAL) Tab Take 1 Tab by mouth every day.    "    No current [facility]-ordered facility-administered medications on file.       Past Surgical History:   Procedure Laterality Date    GANGLION EXCISION Right 6/3/2016    Procedure: GANGLION EXCISION WRIST;  Surgeon: Aman Easton M.D.;  Location: SURGERY SAME DAY [facility];  Service:     SEPTOTURBINOPLASTY  5/6/2010    Performed by ANURADHA JONAS at SURGERY SAME DAY [facility] ORS    ETHMOIDECTOMY  5/6/2010    Performed by ANURADHA JONAS at SURGERY SAME DAY [facility] ORS    ANTROSTOMY  5/6/2010    Performed by ANURADHA JONAS at SURGERY SAME DAY [facility] ORS    OTHER      eus. tubes/adenoids    OTHER ABDOMINAL SURGERY      vasectomy       Social History     Tobacco Use    Smoking status: Never    Smokeless tobacco: Never   Vaping Use    Vaping Use: Never used   Substance Use Topics    Alcohol use: Yes     Alcohol/week: 0.0 oz     Comment: 1 per month    Drug use: No       family history includes Cancer in his mother; Diabetes in his brother and father; Heart Disease in his father.     Problem list, medications, and allergies reviewed by myself today in Epic.     Objective:   /84   Pulse 90   Temp 36.7 °C (98 °F) (Temporal)   Resp 18   Ht 1.727 m (5' 8\")   Wt 99.8 kg (220 lb)   SpO2 95%   BMI 33.45 kg/m²     Physical Exam  Vitals and nursing note reviewed.   Constitutional:       General: He is not in acute distress.     Appearance: Normal appearance. He is well-developed. He is not diaphoretic.   HENT:      Head: Normocephalic.      Right Ear: Hearing normal.      Left Ear: Hearing normal.      Nose: Nose normal.      Mouth/Throat:      Mouth: Mucous membranes are moist.   Eyes:      General:         Right eye: No discharge.         Left eye: No discharge.      Conjunctiva/sclera: Conjunctivae normal.   Cardiovascular:      Rate and Rhythm: Normal rate and regular rhythm.      Pulses: Normal pulses.      Heart sounds: Normal heart sounds. No murmur heard.  Pulmonary:      Effort: Pulmonary effort is " normal. No respiratory distress.      Breath sounds: Normal breath sounds.   Chest:      Chest wall: Tenderness (4th, 5th and 6th costochondrol sternal junction) present. No crepitus.       Abdominal:      General: Abdomen is flat.      Palpations: Abdomen is soft.   Musculoskeletal:         General: Normal range of motion.      Cervical back: Normal range of motion and neck supple.      Right lower leg: No edema.      Left lower leg: No edema.   Skin:     General: Skin is warm and dry.      Capillary Refill: Capillary refill takes less than 2 seconds.   Neurological:      General: No focal deficit present.      Mental Status: He is alert and oriented to person, place, and time.   Psychiatric:         Mood and Affect: Mood normal.         Assessment/Plan:     RADIOLOGY RESULTS   DX-CHEST-2 VIEWS    Result Date: 6/14/2023 6/14/2023 3:07 PM HISTORY/REASON FOR EXAM:  Atraumatic chest pain for 4 days. TECHNIQUE/EXAM DESCRIPTION AND NUMBER OF VIEWS: Two views of the chest. COMPARISON:  5/2/2018 FINDINGS: The mediastinal and cardiac silhouette is unremarkable. The pulmonary vascularity is within normal limits. The lung parenchyma is clear. There is no significant pleural effusion. There is no visible pneumothorax. There are no acute bony abnormalities.     1.  Unremarkable two view chest.       EKG sinus rhythm rate of 78, no ST elevation, normal axis    Diagnosis and associated orders:   1. Costochondral chest pain        2. Chest pain, unspecified type  EKG - Clinic Performed    DX-CHEST-2 VIEWS         Comments/MDM:     Vital signs within normal limits, no red flag signs or symptoms  Reviewed x-ray, no acute cardiopulmonary abnormalities  EKG sinus rhythm without signs of ischemia  Pain is intermittent and random, not exercise related, but also reproducible along the fourth, fifth and 6th costochondral junction  Discussed going to the ER for full cardiac work-up, declined at this time, we did discuss the risks and he  verbalized understanding  Discussed NSAIDs and heating pack  Monitoring symptoms with strict ER precautions  Follow up with primary care provider          Differential diagnosis, natural history, supportive care, and indications for immediate follow-up discussed.    Return to clinic or go to ED if symptoms worsen or persist. Indications for ED discussed at length. Patient/Parent/Guardian voices understanding. Follow-up with your primary care provider in 3-5 days. Red flag symptoms discussed. All side effects of medication discussed including allergic response, GI upset, tendon injury, rash, sedation etc.    I personally reviewed prior external notes and test results pertinent to today's visit as well as additional imaging and testing completed in clinic today.     Please note that this dictation was created using voice recognition software. I have made every reasonable attempt to correct obvious errors, but I expect that there are errors of grammar and possibly content that I did not discover before finalizing the note.    This note was electronically signed by DANIEL Villalta

## 2024-01-12 SDOH — ECONOMIC STABILITY: INCOME INSECURITY: HOW HARD IS IT FOR YOU TO PAY FOR THE VERY BASICS LIKE FOOD, HOUSING, MEDICAL CARE, AND HEATING?: NOT VERY HARD

## 2024-01-12 SDOH — HEALTH STABILITY: PHYSICAL HEALTH: ON AVERAGE, HOW MANY DAYS PER WEEK DO YOU ENGAGE IN MODERATE TO STRENUOUS EXERCISE (LIKE A BRISK WALK)?: 2 DAYS

## 2024-01-12 SDOH — ECONOMIC STABILITY: INCOME INSECURITY: IN THE LAST 12 MONTHS, WAS THERE A TIME WHEN YOU WERE NOT ABLE TO PAY THE MORTGAGE OR RENT ON TIME?: NO

## 2024-01-12 SDOH — ECONOMIC STABILITY: TRANSPORTATION INSECURITY
IN THE PAST 12 MONTHS, HAS THE LACK OF TRANSPORTATION KEPT YOU FROM MEDICAL APPOINTMENTS OR FROM GETTING MEDICATIONS?: NO

## 2024-01-12 SDOH — ECONOMIC STABILITY: TRANSPORTATION INSECURITY
IN THE PAST 12 MONTHS, HAS LACK OF RELIABLE TRANSPORTATION KEPT YOU FROM MEDICAL APPOINTMENTS, MEETINGS, WORK OR FROM GETTING THINGS NEEDED FOR DAILY LIVING?: NO

## 2024-01-12 SDOH — ECONOMIC STABILITY: FOOD INSECURITY: WITHIN THE PAST 12 MONTHS, YOU WORRIED THAT YOUR FOOD WOULD RUN OUT BEFORE YOU GOT MONEY TO BUY MORE.: NEVER TRUE

## 2024-01-12 SDOH — ECONOMIC STABILITY: HOUSING INSECURITY
IN THE LAST 12 MONTHS, WAS THERE A TIME WHEN YOU DID NOT HAVE A STEADY PLACE TO SLEEP OR SLEPT IN A SHELTER (INCLUDING NOW)?: NO

## 2024-01-12 SDOH — HEALTH STABILITY: PHYSICAL HEALTH: ON AVERAGE, HOW MANY MINUTES DO YOU ENGAGE IN EXERCISE AT THIS LEVEL?: 30 MIN

## 2024-01-12 SDOH — ECONOMIC STABILITY: HOUSING INSECURITY: IN THE LAST 12 MONTHS, HOW MANY PLACES HAVE YOU LIVED?: 1

## 2024-01-12 SDOH — ECONOMIC STABILITY: TRANSPORTATION INSECURITY
IN THE PAST 12 MONTHS, HAS LACK OF TRANSPORTATION KEPT YOU FROM MEETINGS, WORK, OR FROM GETTING THINGS NEEDED FOR DAILY LIVING?: NO

## 2024-01-12 SDOH — ECONOMIC STABILITY: FOOD INSECURITY: WITHIN THE PAST 12 MONTHS, THE FOOD YOU BOUGHT JUST DIDN'T LAST AND YOU DIDN'T HAVE MONEY TO GET MORE.: NEVER TRUE

## 2024-01-12 SDOH — HEALTH STABILITY: MENTAL HEALTH
STRESS IS WHEN SOMEONE FEELS TENSE, NERVOUS, ANXIOUS, OR CAN'T SLEEP AT NIGHT BECAUSE THEIR MIND IS TROUBLED. HOW STRESSED ARE YOU?: RATHER MUCH

## 2024-01-12 ASSESSMENT — SOCIAL DETERMINANTS OF HEALTH (SDOH)
HOW OFTEN DO YOU HAVE A DRINK CONTAINING ALCOHOL: 2-4 TIMES A MONTH
DO YOU BELONG TO ANY CLUBS OR ORGANIZATIONS SUCH AS CHURCH GROUPS UNIONS, FRATERNAL OR ATHLETIC GROUPS, OR SCHOOL GROUPS?: NO
HOW HARD IS IT FOR YOU TO PAY FOR THE VERY BASICS LIKE FOOD, HOUSING, MEDICAL CARE, AND HEATING?: NOT VERY HARD
HOW OFTEN DO YOU ATTENT MEETINGS OF THE CLUB OR ORGANIZATION YOU BELONG TO?: NEVER
HOW OFTEN DO YOU HAVE SIX OR MORE DRINKS ON ONE OCCASION: LESS THAN MONTHLY
HOW OFTEN DO YOU ATTEND CHURCH OR RELIGIOUS SERVICES?: NEVER
WITHIN THE PAST 12 MONTHS, YOU WORRIED THAT YOUR FOOD WOULD RUN OUT BEFORE YOU GOT THE MONEY TO BUY MORE: NEVER TRUE
HOW OFTEN DO YOU ATTENT MEETINGS OF THE CLUB OR ORGANIZATION YOU BELONG TO?: NEVER
HOW MANY DRINKS CONTAINING ALCOHOL DO YOU HAVE ON A TYPICAL DAY WHEN YOU ARE DRINKING: 1 OR 2
HOW OFTEN DO YOU ATTEND CHURCH OR RELIGIOUS SERVICES?: NEVER
DO YOU BELONG TO ANY CLUBS OR ORGANIZATIONS SUCH AS CHURCH GROUPS UNIONS, FRATERNAL OR ATHLETIC GROUPS, OR SCHOOL GROUPS?: NO
HOW OFTEN DO YOU GET TOGETHER WITH FRIENDS OR RELATIVES?: ONCE A WEEK
HOW OFTEN DO YOU GET TOGETHER WITH FRIENDS OR RELATIVES?: ONCE A WEEK
IN A TYPICAL WEEK, HOW MANY TIMES DO YOU TALK ON THE PHONE WITH FAMILY, FRIENDS, OR NEIGHBORS?: ONCE A WEEK
IN A TYPICAL WEEK, HOW MANY TIMES DO YOU TALK ON THE PHONE WITH FAMILY, FRIENDS, OR NEIGHBORS?: ONCE A WEEK

## 2024-01-12 ASSESSMENT — LIFESTYLE VARIABLES
HOW MANY STANDARD DRINKS CONTAINING ALCOHOL DO YOU HAVE ON A TYPICAL DAY: 1 OR 2
HOW OFTEN DO YOU HAVE SIX OR MORE DRINKS ON ONE OCCASION: LESS THAN MONTHLY
SKIP TO QUESTIONS 9-10: 0
AUDIT-C TOTAL SCORE: 3
HOW OFTEN DO YOU HAVE A DRINK CONTAINING ALCOHOL: 2-4 TIMES A MONTH

## 2024-01-15 ENCOUNTER — OFFICE VISIT (OUTPATIENT)
Dept: MEDICAL GROUP | Facility: MEDICAL CENTER | Age: 59
End: 2024-01-15
Payer: COMMERCIAL

## 2024-01-15 VITALS
HEIGHT: 68 IN | OXYGEN SATURATION: 98 % | BODY MASS INDEX: 34.25 KG/M2 | WEIGHT: 226 LBS | SYSTOLIC BLOOD PRESSURE: 130 MMHG | HEART RATE: 78 BPM | DIASTOLIC BLOOD PRESSURE: 82 MMHG | TEMPERATURE: 98 F

## 2024-01-15 DIAGNOSIS — R73.03 PREDIABETES: ICD-10-CM

## 2024-01-15 DIAGNOSIS — M79.641 PAIN IN BOTH HANDS: ICD-10-CM

## 2024-01-15 DIAGNOSIS — N64.4 BREAST PAIN, RIGHT: ICD-10-CM

## 2024-01-15 DIAGNOSIS — F51.01 PRIMARY INSOMNIA: ICD-10-CM

## 2024-01-15 DIAGNOSIS — E66.09 CLASS 1 OBESITY DUE TO EXCESS CALORIES WITH SERIOUS COMORBIDITY AND BODY MASS INDEX (BMI) OF 34.0 TO 34.9 IN ADULT: ICD-10-CM

## 2024-01-15 DIAGNOSIS — E66.9 OBESITY (BMI 30-39.9): ICD-10-CM

## 2024-01-15 DIAGNOSIS — R10.13 EPIGASTRIC PAIN: ICD-10-CM

## 2024-01-15 DIAGNOSIS — R21 RASH: ICD-10-CM

## 2024-01-15 DIAGNOSIS — K76.0 NONALCOHOLIC FATTY LIVER DISEASE WITHOUT NONALCOHOLIC STEATOHEPATITIS (NASH): ICD-10-CM

## 2024-01-15 DIAGNOSIS — E78.2 MIXED HYPERLIPIDEMIA: ICD-10-CM

## 2024-01-15 DIAGNOSIS — L98.9 SKIN LESION: ICD-10-CM

## 2024-01-15 DIAGNOSIS — Z13.29 THYROID DISORDER SCREENING: ICD-10-CM

## 2024-01-15 DIAGNOSIS — R42 VERTIGO: ICD-10-CM

## 2024-01-15 DIAGNOSIS — Z13.0 SCREENING FOR DEFICIENCY ANEMIA: ICD-10-CM

## 2024-01-15 DIAGNOSIS — Z00.00 ENCOUNTER FOR MEDICAL EXAMINATION TO ESTABLISH CARE: Primary | ICD-10-CM

## 2024-01-15 DIAGNOSIS — F41.0 PANIC ATTACK: ICD-10-CM

## 2024-01-15 DIAGNOSIS — K21.9 GASTROESOPHAGEAL REFLUX DISEASE WITHOUT ESOPHAGITIS: ICD-10-CM

## 2024-01-15 DIAGNOSIS — E55.9 VITAMIN D DEFICIENCY: ICD-10-CM

## 2024-01-15 DIAGNOSIS — M79.642 PAIN IN BOTH HANDS: ICD-10-CM

## 2024-01-15 PROBLEM — Z12.83 SKIN CANCER SCREENING: Status: RESOLVED | Noted: 2017-04-17 | Resolved: 2024-01-15

## 2024-01-15 PROBLEM — Z12.11 COLON CANCER SCREENING: Status: RESOLVED | Noted: 2017-04-17 | Resolved: 2024-01-15

## 2024-01-15 PROCEDURE — 3079F DIAST BP 80-89 MM HG: CPT | Performed by: FAMILY MEDICINE

## 2024-01-15 PROCEDURE — 99214 OFFICE O/P EST MOD 30 MIN: CPT | Performed by: FAMILY MEDICINE

## 2024-01-15 PROCEDURE — 3075F SYST BP GE 130 - 139MM HG: CPT | Performed by: FAMILY MEDICINE

## 2024-01-15 RX ORDER — MECLIZINE HYDROCHLORIDE 25 MG/1
25 TABLET ORAL 3 TIMES DAILY PRN
COMMUNITY

## 2024-01-15 RX ORDER — ALPRAZOLAM 0.5 MG/1
0.5 TABLET ORAL NIGHTLY PRN
COMMUNITY

## 2024-01-15 RX ORDER — AZELASTINE HYDROCHLORIDE, FLUTICASONE PROPIONATE 137; 50 UG/1; UG/1
SPRAY, METERED NASAL
COMMUNITY
Start: 2023-12-14

## 2024-01-15 RX ORDER — CLOTRIMAZOLE AND BETAMETHASONE DIPROPIONATE 10; .64 MG/G; MG/G
CREAM TOPICAL
COMMUNITY
Start: 2024-01-04

## 2024-01-15 ASSESSMENT — PATIENT HEALTH QUESTIONNAIRE - PHQ9: CLINICAL INTERPRETATION OF PHQ2 SCORE: 0

## 2024-01-15 ASSESSMENT — FIBROSIS 4 INDEX: FIB4 SCORE: 1.17

## 2024-01-15 NOTE — ASSESSMENT & PLAN NOTE
Chronic, unstable.  Recommended to reduce the amount of animal fats and proteins in your diet.   Check fasting lipid profile.

## 2024-01-15 NOTE — ASSESSMENT & PLAN NOTE
Subacute.  Physical exam: Rounded abdomen noted.  Based on patient's description and improvement with eating food I do suspect it is more related to indigestion.  Try taking Nexium 20 mg in the evening time prior to bed to see if it will improve his symptoms.

## 2024-01-15 NOTE — ASSESSMENT & PLAN NOTE
Chronic, unstable.  Recommended to continue to follow-up with dermatology to inform them that the rash returns as soon as he stops the medication.  May need biopsy.  Change towel every day with shower to prevent spreading and reoccurring inflammation.

## 2024-01-15 NOTE — ASSESSMENT & PLAN NOTE
Chronic, stable.  Continue to monitor symptoms.  Try to reduce the amount of sodium in your diet to help reduce fluid buildup.  Continue meclizine 25 mg as needed for vertigo symptoms.  May consider referral to physical therapy for Epley maneuvers.

## 2024-01-15 NOTE — ASSESSMENT & PLAN NOTE
Chronic, unstable.  Continue Nexium 20 mg 30 minutes prior to the first meal of the day and then repeat again at nighttime.  Discussed making changes to diet as well as increasing cardiovascular exercise.  I do feel that this will cause a substantial improvement in acid reflux symptoms.

## 2024-01-15 NOTE — ASSESSMENT & PLAN NOTE
Chronic, stable.  Okay to continue Xanax 0.5 mg as needed for panic attacks.  When ready for refill needs to be seen back into the clinic and fill out paperwork.

## 2024-01-15 NOTE — ASSESSMENT & PLAN NOTE
Chronic, presumed stable.  Check A1c, GFR, CMP.  Reduce the amount of carbs and sugary foods/beverages that she can take daily.

## 2024-01-15 NOTE — ASSESSMENT & PLAN NOTE
Subacute.  Notable swelling to the right breast.  Diagnostic mammogram and ultrasound ordered for the patient.  Suspect gynecomastia.

## 2024-01-15 NOTE — ASSESSMENT & PLAN NOTE
Chronic, stable.  Continue to use melatonin to help fall asleep.  Can also try magnesium 400 mg once daily to help with sleep.  If quality of sleep remains, no need to change the time that he wakes.  Discussed the use of meditation or external devices to help distract his thought process allowing him to go back to sleep.

## 2024-01-15 NOTE — PROGRESS NOTES
Kevin Stoddard is a pleasant 58 y.o. male here to establish care.    HPI:   Problem   Rash    Bilateral axillary, present for up to a year.  Discussed this with his dermatologist to divide ketoconazole topical.  Started to use this noticed significant aggravation of the rash.  Has switched out to his deodorants but no improvement.  Uses 1 bath towel for the week and washes it over the weekend.  Wife has a similar rash to bilateral inner thighs.  Has been prescribed a combination cream Lotrisone 1-0.05% cream which is helpful when he uses it.  When he is not using it to the rash returns.  Continues to follow with dermatology     Breast Pain, Right    Present for a few months, noticed it after the dog jumped on him.  Continues to have tenderness.  Noticed enlargement of the right breast tissue.     Primary Insomnia    Currently using melatonin to help fall asleep.  He will generally wake up between 5 and 6 in the morning.  This does not matter at what time he falls asleep he will wake up at the same time every day.  No longer sleeps with an alarm.  Uses a mouth appliance to help with snoring  Denies any significant concerns for quality of sleep.     Pain in Both Hands    Present for some time now will use topical diclofenac gel and natural Bengay.  Notices more pain to his CMC joints after the use of fine motor skills.  Likes to put Legos together, this will trigger the pain on the palmar aspect of bilateral thumbs.  Notes that right is worse than left.  No significant injury or swelling.     Panic Attack    Since 2018 after his vertigo symptoms he has noticed increasing panic attacks as symptoms of anxiety.  He will use Xanax 0.5 mg to help during flights.  While he was in [facility] he was walking in a tunnel when he started to develop increased in panic type symptoms.  Does not use Xanax daily or often only when the panic symptoms set in.  Does not need refill today.     Epigastric Pain    When he wakes up  for the last few months.  Epigastric, will last an hour.  Doesn't eat late.  Will eat and the pain will go away.  + history for GERD and taking Nexium     Obesity (Bmi 30-39.9)   Obesity Due to Excess Calories With Serious Comorbidity   Vertigo    Really bad bout of vertigo back in 2018 improved with meclizine.  Will occasionally get a flare which she still treats himself with meclizine 25 mg as needed.  Does not need refill at this time.     Encounter for Medical Examination to Establish Care    Kevin is a 58-year-old male who is here to establish care.  He has multiple questions he would like to review today.     Gastroesophageal Reflux Disease Without Esophagitis    Pleasant history for acid reflux which she takes Nexium 20 mg over-the-counter 30 minutes prior to the first meal of the day.  Has been experiencing some epigastric discomfort upon awakening though not every morning.     Hyperlipidemia    Previous labs back in January 2023 showed elevation to his triglycerides, LDL with low HDL levels.  Positive history for nonalcoholic fatty liver disease  Has not made any significant changes to his diet.     Prediabetes    Last A1c at 5.7% completed in January 2023.  Has not made any significant changes to his diet.  Most recently started to do pickleball.     Skin Cancer Screening (Resolved)   Colon Cancer Screening (Resolved)   Bmi 32.0-32.9,Adult (Resolved)   Left Foot Pain (Resolved)   Ganglion of Right Wrist (Resolved)          Current medicines (including changes today)  Current Outpatient Medications   Medication Sig Dispense Refill    clotrimazole-betamethasone (LOTRISONE) 1-0.05 % Cream PLEASE SEE ATTACHED FOR DETAILED DIRECTIONS      VITAMIN D PO       Probiotic Product (PROBIOTIC PO) Take  by mouth.      NON SPECIFIED Candida support      MELATONIN PO Take  by mouth.      meclizine (ANTIVERT) 25 MG Tab Take 25 mg by mouth 3 times a day as needed for Vertigo. Indications: Sensation of Spinning or Whirling  "     ALPRAZolam (XANAX) 0.5 MG Tab Take 0.5 mg by mouth at bedtime as needed for Sleep.      esomeprazole (NEXIUM) 20 MG capsule Take 1 Capsule by mouth 2 times a day. 180 Capsule 3    Omega-3 Fatty Acids (FISH OIL) 1200 MG Cap Take 2 Capsule by mouth every day.      Multiple Vitamins-Minerals (MENS MULTI VITAMIN & MINERAL) Tab Take 1 Tab by mouth every day.      Azelastine-Fluticasone 137-50 MCG/ACT Suspension SPRAY 1 SPRAY INTO THE APPROPRIATE NOSTRILS TWICE A DAY       No current facility-administered medications for this visit.       Past Medical/ Surgical History  He  has a past medical history of Allergy, Cancer (HCC), Coital headache, Indigestion, and kidney stones.    He has no past medical history of CAD (coronary artery disease), COPD, Liver disease, Psychiatric disorder, or Seizure disorder ([facility]).  He  has a past surgical history that includes other; septoturbinoplasty (2010); ethmoidectomy (2010); antrostomy (2010); ganglion excision (Right, 2016); eye surgery; and vasectomy.    Social History  Social History     Tobacco Use    Smoking status: Never    Smokeless tobacco: Never   Vaping Use    Vaping Use: Never used   Substance Use Topics    Alcohol use: Yes     Alcohol/week: 0.6 oz     Types: 1 Cans of beer per week     Comment: 1 per month    Drug use: No     Social History     Social History Narrative    Not on file        Family History  Family History   Problem Relation Age of Onset    Cancer Mother         ovarian, breast    Ovarian Cancer Mother     Breast Cancer Mother     Heart Disease Father     Diabetes Father     Diabetes Brother      Family Status   Relation Name Status    Miko Stoddard     Fa Santos Stoddard Alive    Sis 3 Alive    Azael Stoddard Alive    Son  Alive    Son  Alive        Objective:     /82   Pulse 78   Temp 36.7 °C (98 °F)   Ht 1.727 m (5' 8\")   Wt 103 kg (226 lb)   SpO2 98%  Body mass index is 34.36 kg/m².    Physical " Exam  Constitutional:       General: He is not in acute distress.     Appearance: He is obese.   HENT:      Head: Normocephalic and atraumatic.      Right Ear: Tympanic membrane and external ear normal.      Left Ear: Tympanic membrane and external ear normal.      Nose: No nasal deformity.      Mouth/Throat:      Lips: Pink.      Mouth: Mucous membranes are moist.      Pharynx: Oropharynx is clear. Uvula midline. No posterior oropharyngeal erythema.   Eyes:      General: Lids are normal.      Extraocular Movements: Extraocular movements intact.      Conjunctiva/sclera: Conjunctivae normal.      Pupils: Pupils are equal, round, and reactive to light.   Neck:      Trachea: Trachea normal.   Cardiovascular:      Rate and Rhythm: Normal rate and regular rhythm.      Heart sounds: Normal heart sounds. No murmur heard.     No friction rub. No gallop.   Pulmonary:      Effort: Pulmonary effort is normal. No accessory muscle usage.      Breath sounds: Normal breath sounds. No wheezing or rales.   Abdominal:      General: Bowel sounds are normal.      Palpations: Abdomen is soft.      Tenderness: There is no abdominal tenderness.   Musculoskeletal:      Cervical back: Normal range of motion and neck supple.      Right lower leg: No edema.      Left lower leg: No edema.   Lymphadenopathy:      Cervical: No cervical adenopathy.   Skin:     General: Skin is warm and dry.      Findings: No rash.   Neurological:      General: No focal deficit present.      Mental Status: He is alert and oriented to person, place, and time. Mental status is at baseline.      GCS: GCS eye subscore is 4. GCS verbal subscore is 5. GCS motor subscore is 6.      Motor: No weakness.      Gait: Gait is intact.   Psychiatric:         Attention and Perception: Attention normal.         Mood and Affect: Mood and affect normal.         Speech: Speech normal.          Imaging:  No imaging to review    Labs:  Most recent labs from 01/16/2023 reviewed with  patient.  Assessment and Plan:   The following treatment plan was discussed     Problem List Items Addressed This Visit       Hyperlipidemia (Chronic)     Chronic, unstable.  Recommended to reduce the amount of animal fats and proteins in your diet.   Check fasting lipid profile.         Relevant Orders    Lipid Profile    Prediabetes (Chronic)     Chronic, presumed stable.  Check A1c, GFR, CMP.  Reduce the amount of carbs and sugary foods/beverages that she can take daily.         Relevant Orders    HEMOGLOBIN A1C    Gastroesophageal reflux disease without esophagitis (Chronic)     Chronic, unstable.  Continue Nexium 20 mg 30 minutes prior to the first meal of the day and then repeat again at nighttime.  Discussed making changes to diet as well as increasing cardiovascular exercise.  I do feel that this will cause a substantial improvement in acid reflux symptoms.         Relevant Medications    Probiotic Product (PROBIOTIC PO)    meclizine (ANTIVERT) 25 MG Tab    esomeprazole (NEXIUM) 20 MG capsule    Vertigo (Chronic)     Chronic, stable.  Continue to monitor symptoms.  Try to reduce the amount of sodium in your diet to help reduce fluid buildup.  Continue meclizine 25 mg as needed for vertigo symptoms.  May consider referral to physical therapy for Epley maneuvers.         Rash (Chronic)     Chronic, unstable.  Recommended to continue to follow-up with dermatology to inform them that the rash returns as soon as he stops the medication.  May need biopsy.  Change towel every day with shower to prevent spreading and reoccurring inflammation.         Primary insomnia (Chronic)     Chronic, stable.  Continue to use melatonin to help fall asleep.  Can also try magnesium 400 mg once daily to help with sleep.  If quality of sleep remains, no need to change the time that he wakes.  Discussed the use of meditation or external devices to help distract his thought process allowing him to go back to sleep.         Pain in  both hands (Chronic)     Chronic, stable.  Suspect possible arthritis.  Joint survey of the hands ordered for the patient.         Relevant Orders    DX-JOINT SURVEY-HANDS SINGLE VIEW    Panic attack (Chronic)     Chronic, stable.  Okay to continue Xanax 0.5 mg as needed for panic attacks.  When ready for refill needs to be seen back into the clinic and fill out paperwork.         Relevant Medications    ALPRAZolam (XANAX) 0.5 MG Tab    Encounter for medical examination to establish care - Primary    Breast pain, right     Subacute.  Notable swelling to the right breast.  Diagnostic mammogram and ultrasound ordered for the patient.  Suspect gynecomastia.         Relevant Orders    MA-DIAGNOSTIC MAMMO RIGHT W/TOMOSYNTHESIS W/CAD    US-BREAST LIMITED-RIGHT    Epigastric pain     Subacute.  Physical exam: Rounded abdomen noted.  Based on patient's description and improvement with eating food I do suspect it is more related to indigestion.  Try taking Nexium 20 mg in the evening time prior to bed to see if it will improve his symptoms.         Relevant Medications    esomeprazole (NEXIUM) 20 MG capsule    Obesity (BMI 30-39.9)    Obesity due to excess calories with serious comorbidity    Relevant Orders    Patient identified as having weight management issue.  Appropriate orders and counseling given.     Other Visit Diagnoses       Skin lesion        Relevant Orders    Referral to Ophthalmology    Screening for deficiency anemia        Relevant Orders    CBC WITH DIFFERENTIAL    Nonalcoholic fatty liver disease without nonalcoholic steatohepatitis (ELIZABETH)        Relevant Orders    Comp Metabolic Panel    ESTIMATED GFR    Lipid Profile    Vitamin D deficiency        Relevant Orders    VITAMIN D,25 HYDROXY (DEFICIENCY)    Thyroid disorder screening        Relevant Orders    TSH+FREE T4             Followup: Return in about 4 weeks (around 2/12/2024) for Lab review.    Please note that this dictation was created using voice  recognition software. I have made every reasonable attempt to correct obvious errors, but I expect that there are errors of grammar and possibly content that I did not discover before finalizing the note.

## 2024-01-17 ENCOUNTER — HOSPITAL ENCOUNTER (OUTPATIENT)
Dept: RADIOLOGY | Facility: MEDICAL CENTER | Age: 59
End: 2024-01-17
Attending: FAMILY MEDICINE
Payer: COMMERCIAL

## 2024-01-17 DIAGNOSIS — M79.641 PAIN IN BOTH HANDS: ICD-10-CM

## 2024-01-17 DIAGNOSIS — M79.642 PAIN IN BOTH HANDS: ICD-10-CM

## 2024-01-17 PROCEDURE — 77077 JOINT SURVEY SINGLE VIEW: CPT

## 2024-01-18 ENCOUNTER — HOSPITAL ENCOUNTER (OUTPATIENT)
Dept: LAB | Facility: MEDICAL CENTER | Age: 59
End: 2024-01-18
Attending: FAMILY MEDICINE
Payer: COMMERCIAL

## 2024-01-18 DIAGNOSIS — E78.2 MIXED HYPERLIPIDEMIA: ICD-10-CM

## 2024-01-18 DIAGNOSIS — R73.03 PREDIABETES: ICD-10-CM

## 2024-01-18 DIAGNOSIS — K76.0 NONALCOHOLIC FATTY LIVER DISEASE WITHOUT NONALCOHOLIC STEATOHEPATITIS (NASH): ICD-10-CM

## 2024-01-18 DIAGNOSIS — E55.9 VITAMIN D DEFICIENCY: ICD-10-CM

## 2024-01-18 DIAGNOSIS — Z13.0 SCREENING FOR DEFICIENCY ANEMIA: ICD-10-CM

## 2024-01-18 LAB
25(OH)D3 SERPL-MCNC: 72 NG/ML (ref 30–100)
ALBUMIN SERPL BCP-MCNC: 4.3 G/DL (ref 3.2–4.9)
ALBUMIN/GLOB SERPL: 1.7 G/DL
ALP SERPL-CCNC: 62 U/L (ref 30–99)
ALT SERPL-CCNC: 96 U/L (ref 2–50)
ANION GAP SERPL CALC-SCNC: 9 MMOL/L (ref 7–16)
AST SERPL-CCNC: 44 U/L (ref 12–45)
BASOPHILS # BLD AUTO: 1 % (ref 0–1.8)
BASOPHILS # BLD: 0.07 K/UL (ref 0–0.12)
BILIRUB SERPL-MCNC: 0.7 MG/DL (ref 0.1–1.5)
BUN SERPL-MCNC: 15 MG/DL (ref 8–22)
CALCIUM ALBUM COR SERPL-MCNC: 9.3 MG/DL (ref 8.5–10.5)
CALCIUM SERPL-MCNC: 9.5 MG/DL (ref 8.5–10.5)
CHLORIDE SERPL-SCNC: 103 MMOL/L (ref 96–112)
CHOLEST SERPL-MCNC: 197 MG/DL (ref 100–199)
CO2 SERPL-SCNC: 30 MMOL/L (ref 20–33)
CREAT SERPL-MCNC: 1.07 MG/DL (ref 0.5–1.4)
EOSINOPHIL # BLD AUTO: 0.31 K/UL (ref 0–0.51)
EOSINOPHIL NFR BLD: 4.3 % (ref 0–6.9)
ERYTHROCYTE [DISTWIDTH] IN BLOOD BY AUTOMATED COUNT: 41.3 FL (ref 35.9–50)
EST. AVERAGE GLUCOSE BLD GHB EST-MCNC: 123 MG/DL
FASTING STATUS PATIENT QL REPORTED: NORMAL
GFR SERPLBLD CREATININE-BSD FMLA CKD-EPI: 81 ML/MIN/1.73 M 2
GLOBULIN SER CALC-MCNC: 2.6 G/DL (ref 1.9–3.5)
GLUCOSE SERPL-MCNC: 111 MG/DL (ref 65–99)
HBA1C MFR BLD: 5.9 % (ref 4–5.6)
HCT VFR BLD AUTO: 48.7 % (ref 42–52)
HDLC SERPL-MCNC: 34 MG/DL
HGB BLD-MCNC: 16.8 G/DL (ref 14–18)
IMM GRANULOCYTES # BLD AUTO: 0.02 K/UL (ref 0–0.11)
IMM GRANULOCYTES NFR BLD AUTO: 0.3 % (ref 0–0.9)
LDLC SERPL CALC-MCNC: 133 MG/DL
LYMPHOCYTES # BLD AUTO: 2.34 K/UL (ref 1–4.8)
LYMPHOCYTES NFR BLD: 32.7 % (ref 22–41)
MCH RBC QN AUTO: 30.9 PG (ref 27–33)
MCHC RBC AUTO-ENTMCNC: 34.5 G/DL (ref 32.3–36.5)
MCV RBC AUTO: 89.7 FL (ref 81.4–97.8)
MONOCYTES # BLD AUTO: 0.91 K/UL (ref 0–0.85)
MONOCYTES NFR BLD AUTO: 12.7 % (ref 0–13.4)
NEUTROPHILS # BLD AUTO: 3.51 K/UL (ref 1.82–7.42)
NEUTROPHILS NFR BLD: 49 % (ref 44–72)
NRBC # BLD AUTO: 0 K/UL
NRBC BLD-RTO: 0 /100 WBC (ref 0–0.2)
PLATELET # BLD AUTO: 290 K/UL (ref 164–446)
PMV BLD AUTO: 10.3 FL (ref 9–12.9)
POTASSIUM SERPL-SCNC: 4 MMOL/L (ref 3.6–5.5)
PROT SERPL-MCNC: 6.9 G/DL (ref 6–8.2)
RBC # BLD AUTO: 5.43 M/UL (ref 4.7–6.1)
SODIUM SERPL-SCNC: 142 MMOL/L (ref 135–145)
T4 FREE SERPL-MCNC: 1.27 NG/DL (ref 0.93–1.7)
TRIGL SERPL-MCNC: 149 MG/DL (ref 0–149)
TSH SERPL DL<=0.005 MIU/L-ACNC: 2.28 UIU/ML (ref 0.38–5.33)
WBC # BLD AUTO: 7.2 K/UL (ref 4.8–10.8)

## 2024-01-18 PROCEDURE — 83036 HEMOGLOBIN GLYCOSYLATED A1C: CPT

## 2024-01-18 PROCEDURE — 36415 COLL VENOUS BLD VENIPUNCTURE: CPT

## 2024-01-18 PROCEDURE — 80053 COMPREHEN METABOLIC PANEL: CPT

## 2024-01-18 PROCEDURE — 84443 ASSAY THYROID STIM HORMONE: CPT

## 2024-01-18 PROCEDURE — 85025 COMPLETE CBC W/AUTO DIFF WBC: CPT

## 2024-01-18 PROCEDURE — 84439 ASSAY OF FREE THYROXINE: CPT

## 2024-01-18 PROCEDURE — 80061 LIPID PANEL: CPT

## 2024-01-18 PROCEDURE — 82306 VITAMIN D 25 HYDROXY: CPT

## 2024-02-07 ENCOUNTER — HOSPITAL ENCOUNTER (OUTPATIENT)
Dept: RADIOLOGY | Facility: MEDICAL CENTER | Age: 59
End: 2024-02-07
Attending: FAMILY MEDICINE
Payer: COMMERCIAL

## 2024-02-07 DIAGNOSIS — N64.4 BREAST PAIN, RIGHT: ICD-10-CM

## 2024-02-07 PROCEDURE — 76642 ULTRASOUND BREAST LIMITED: CPT | Mod: RT

## 2024-02-07 PROCEDURE — G0279 TOMOSYNTHESIS, MAMMO: HCPCS

## 2024-02-09 ENCOUNTER — OFFICE VISIT (OUTPATIENT)
Dept: MEDICAL GROUP | Facility: MEDICAL CENTER | Age: 59
End: 2024-02-09
Payer: COMMERCIAL

## 2024-02-09 VITALS
WEIGHT: 227 LBS | OXYGEN SATURATION: 96 % | DIASTOLIC BLOOD PRESSURE: 82 MMHG | SYSTOLIC BLOOD PRESSURE: 126 MMHG | TEMPERATURE: 97.3 F | HEIGHT: 68 IN | HEART RATE: 88 BPM | BODY MASS INDEX: 34.4 KG/M2

## 2024-02-09 DIAGNOSIS — R73.03 PREDIABETES: Chronic | ICD-10-CM

## 2024-02-09 DIAGNOSIS — N64.4 BREAST PAIN, RIGHT: ICD-10-CM

## 2024-02-09 DIAGNOSIS — R74.8 ELEVATED LIVER ENZYMES: Chronic | ICD-10-CM

## 2024-02-09 DIAGNOSIS — E78.2 MIXED HYPERLIPIDEMIA: Chronic | ICD-10-CM

## 2024-02-09 PROCEDURE — 99214 OFFICE O/P EST MOD 30 MIN: CPT | Performed by: FAMILY MEDICINE

## 2024-02-09 PROCEDURE — 3079F DIAST BP 80-89 MM HG: CPT | Performed by: FAMILY MEDICINE

## 2024-02-09 PROCEDURE — 3074F SYST BP LT 130 MM HG: CPT | Performed by: FAMILY MEDICINE

## 2024-02-09 RX ORDER — EZETIMIBE 10 MG/1
10 TABLET ORAL DAILY
Qty: 90 TABLET | Refills: 3 | Status: SHIPPED | OUTPATIENT
Start: 2024-02-09

## 2024-02-09 ASSESSMENT — FIBROSIS 4 INDEX: FIB4 SCORE: 0.87

## 2024-02-09 NOTE — ASSESSMENT & PLAN NOTE
Chronic, stable.  -Initiate Zetia to help with cholesterol level  -Check hepatic function panel 3 months after initiating Zetia.

## 2024-02-09 NOTE — ASSESSMENT & PLAN NOTE
Subacute.  -Can use warm compresses  -Avoid soy containing products  -Monitor for worsening symptoms

## 2024-02-09 NOTE — ASSESSMENT & PLAN NOTE
Chronic, unstable  -Try intermittent fasting  -Drink lots of water  -Avoid high carbs  -Walk after large meals

## 2024-02-09 NOTE — PROGRESS NOTES
Kevin Stoddard is a pleasant 58 y.o. male here for   Chief Complaint   Patient presents with    Lab Results      HPI:   Problem   Elevated Liver Enzymes    Normal long history of elevated liver enzymes.  Ultrasound of his abdomen in 2011 showed normal contour  Platelet counts are normal.  Was on a statin in the past but stopped taking this medication due to increase in liver enzymes.  Recent completed labs which showed stable liver enzymes.     Breast Pain, Right    Present for a few months, noticed it after the dog jumped on him.  Continues to have tenderness.  Noticed enlargement of the right breast tissue.  Completed mammogram and ultrasound of the breast which showed gynecomastia  Advised by radiology to repeat imaging if swelling and tenderness worsens     Hyperlipidemia    History of hyperlipidemia, not on any medication.  Recent set of labs showed non-HDL cholesterol 163.    The 10-year ASCVD risk score (Vernell VILLEGAS, et al., 2019) is: 8.1%    Values used to calculate the score:      Age: 58 years      Sex: Male      Is Non- : No      Diabetic: No      Tobacco smoker: No      Systolic Blood Pressure: 126 mmHg      Is BP treated: No      HDL Cholesterol: 34 mg/dL      Total Cholesterol: 197 mg/dL       Prediabetes    History of prediabetes.  Recent completed labs which showed A1c increased from 5.7% to 5.9%.  Currently not taking any medications.          Current Medicines (including changes today)  Current Outpatient Medications   Medication Sig Dispense Refill    ezetimibe (ZETIA) 10 MG Tab Take 1 Tablet by mouth every day. 90 Tablet 3    clotrimazole-betamethasone (LOTRISONE) 1-0.05 % Cream PLEASE SEE ATTACHED FOR DETAILED DIRECTIONS      Azelastine-Fluticasone 137-50 MCG/ACT Suspension SPRAY 1 SPRAY INTO THE APPROPRIATE NOSTRILS TWICE A DAY      VITAMIN D PO       Probiotic Product (PROBIOTIC PO) Take  by mouth.      NON SPECIFIED Candida support      MELATONIN PO Take  by mouth.  "     meclizine (ANTIVERT) 25 MG Tab Take 25 mg by mouth 3 times a day as needed for Vertigo. Indications: Sensation of Spinning or Whirling      ALPRAZolam (XANAX) 0.5 MG Tab Take 0.5 mg by mouth at bedtime as needed for Sleep.      esomeprazole (NEXIUM) 20 MG capsule Take 1 Capsule by mouth 2 times a day. 180 Capsule 3    Omega-3 Fatty Acids (FISH OIL) 1200 MG Cap Take 2 Capsule by mouth every day.      Multiple Vitamins-Minerals (MENS MULTI VITAMIN & MINERAL) Tab Take 1 Tab by mouth every day.       No current facility-administered medications for this visit.     Past Medical/ Surgical History  He  has a past medical history of Allergy, Cancer (HCC), Coital headache, Indigestion, and kidney stones.    He has no past medical history of CAD (coronary artery disease), COPD, Liver disease, Psychiatric disorder, or Seizure disorder ([facility]).  He  has a past surgical history that includes other; septoturbinoplasty (05/06/2010); ethmoidectomy (05/06/2010); antrostomy (05/06/2010); ganglion excision (Right, 06/03/2016); eye surgery; and vasectomy.     Objective:     /82   Pulse 88   Temp 36.3 °C (97.3 °F)   Ht 1.727 m (5' 8\")   Wt 103 kg (227 lb)   SpO2 96%  Body mass index is 34.52 kg/m².    Physical Exam  Constitutional:       General: He is not in acute distress.  HENT:      Head: Normocephalic and atraumatic.      Right Ear: Tympanic membrane and external ear normal.      Left Ear: Tympanic membrane and external ear normal.      Nose: No nasal deformity.      Mouth/Throat:      Lips: Pink.      Mouth: Mucous membranes are moist.      Pharynx: Oropharynx is clear. Uvula midline. No posterior oropharyngeal erythema.   Eyes:      General: Lids are normal.      Extraocular Movements: Extraocular movements intact.      Conjunctiva/sclera: Conjunctivae normal.      Pupils: Pupils are equal, round, and reactive to light.   Neck:      Trachea: Trachea normal.   Cardiovascular:      Rate and Rhythm: Normal rate and " regular rhythm.      Heart sounds: Normal heart sounds. No murmur heard.     No friction rub. No gallop.   Pulmonary:      Effort: Pulmonary effort is normal. No accessory muscle usage.      Breath sounds: Normal breath sounds. No wheezing or rales.   Abdominal:      General: Bowel sounds are normal.      Palpations: Abdomen is soft.      Tenderness: There is no abdominal tenderness.   Musculoskeletal:      Cervical back: Normal range of motion and neck supple.      Right lower leg: No edema.      Left lower leg: No edema.   Lymphadenopathy:      Cervical: No cervical adenopathy.   Skin:     General: Skin is warm and dry.      Findings: No rash.   Neurological:      General: No focal deficit present.      Mental Status: He is alert and oriented to person, place, and time. Mental status is at baseline.      GCS: GCS eye subscore is 4. GCS verbal subscore is 5. GCS motor subscore is 6.      Motor: No weakness.      Gait: Gait is intact.   Psychiatric:         Attention and Perception: Attention normal.         Mood and Affect: Mood and affect normal.         Speech: Speech normal.          Imaging:  No imaging    Labs  Recent labs from 01/18/2024 reviewed with the patient.    Assessment and Plan:   The following treatment plan was discussed     Problem List Items Addressed This Visit       Hyperlipidemia (Chronic)     Chronic, unstable  -Will initiate Zetia 10 mg daily  -Monitor liver function         Relevant Medications    ezetimibe (ZETIA) 10 MG Tab    Other Relevant Orders    HEPATIC FUNCTION PANEL    Prediabetes (Chronic)     Chronic, unstable  -Try intermittent fasting  -Drink lots of water  -Avoid high carbs  -Walk after large meals         Breast pain, right     Subacute.  -Can use warm compresses  -Avoid soy containing products  -Monitor for worsening symptoms         Elevated liver enzymes     Chronic, stable.  -Initiate Zetia to help with cholesterol level  -Check hepatic function panel 3 months after  initiating Zetia.         Relevant Orders    HEPATIC FUNCTION PANEL        Followup: Return in about 6 months (around 8/9/2024) for lab follow-up.    Please note that this dictation was created using voice recognition software. I have made every reasonable attempt to correct obvious errors, but I expect that there are errors of grammar and possibly content that I did not discover before finalizing the note.

## 2024-05-22 DIAGNOSIS — E78.2 MIXED HYPERLIPIDEMIA: Chronic | ICD-10-CM

## 2024-05-23 RX ORDER — EZETIMIBE 10 MG/1
10 TABLET ORAL DAILY
Qty: 90 TABLET | Refills: 3 | Status: SHIPPED | OUTPATIENT
Start: 2024-05-23

## 2024-05-23 NOTE — TELEPHONE ENCOUNTER
Received request via: Pharmacy    Was the patient seen in the last year in this department? Yes    Does the patient have an active prescription (recently filled or refills available) for medication(s) requested? No    [email or web address]     Does the patient have [facility] and need 100 day supply (blood pressure, diabetes and cholesterol meds only)? Patient does not have SCP      Patient comment: Cirilo Blair,  I tried to refill this through the [employer] website and got a call saying to ask you to submit the prescription.  Can you please order a refill?  I'm trying to get my labs done next week.

## 2024-05-29 ENCOUNTER — HOSPITAL ENCOUNTER (OUTPATIENT)
Dept: LAB | Facility: MEDICAL CENTER | Age: 59
End: 2024-05-29
Attending: PHYSICIAN ASSISTANT
Payer: COMMERCIAL

## 2024-05-29 DIAGNOSIS — R74.8 ELEVATED LIVER ENZYMES: Chronic | ICD-10-CM

## 2024-05-29 DIAGNOSIS — E78.2 MIXED HYPERLIPIDEMIA: Chronic | ICD-10-CM

## 2024-05-30 LAB
ALBUMIN SERPL BCP-MCNC: 4.4 G/DL (ref 3.2–4.9)
ALP SERPL-CCNC: 59 U/L (ref 30–99)
ALT SERPL-CCNC: 116 U/L (ref 2–50)
AST SERPL-CCNC: 64 U/L (ref 12–45)
BILIRUB CONJ SERPL-MCNC: <0.2 MG/DL (ref 0.1–0.5)
BILIRUB INDIRECT SERPL-MCNC: ABNORMAL MG/DL (ref 0–1)
BILIRUB SERPL-MCNC: 0.7 MG/DL (ref 0.1–1.5)
PROT SERPL-MCNC: 7 G/DL (ref 6–8.2)

## 2024-06-10 ENCOUNTER — PATIENT MESSAGE (OUTPATIENT)
Dept: MEDICAL GROUP | Facility: MEDICAL CENTER | Age: 59
End: 2024-06-10
Payer: COMMERCIAL

## 2024-06-10 DIAGNOSIS — E78.2 MIXED HYPERLIPIDEMIA: ICD-10-CM

## 2024-07-01 ENCOUNTER — APPOINTMENT (OUTPATIENT)
Dept: LAB | Facility: MEDICAL CENTER | Age: 59
End: 2024-07-01
Payer: COMMERCIAL

## 2024-07-01 DIAGNOSIS — E78.2 MIXED HYPERLIPIDEMIA: ICD-10-CM

## 2024-07-01 LAB
CHOLEST SERPL-MCNC: 190 MG/DL (ref 100–199)
FASTING STATUS PATIENT QL REPORTED: NORMAL
HDLC SERPL-MCNC: 38 MG/DL
LDLC SERPL CALC-MCNC: 116 MG/DL
TRIGL SERPL-MCNC: 180 MG/DL (ref 0–149)

## 2024-07-01 PROCEDURE — 80061 LIPID PANEL: CPT

## 2024-07-01 PROCEDURE — 36415 COLL VENOUS BLD VENIPUNCTURE: CPT

## 2024-08-05 ENCOUNTER — OFFICE VISIT (OUTPATIENT)
Dept: MEDICAL GROUP | Facility: MEDICAL CENTER | Age: 59
End: 2024-08-05
Payer: COMMERCIAL

## 2024-08-05 VITALS
TEMPERATURE: 98.1 F | HEART RATE: 82 BPM | SYSTOLIC BLOOD PRESSURE: 132 MMHG | DIASTOLIC BLOOD PRESSURE: 68 MMHG | BODY MASS INDEX: 34.4 KG/M2 | WEIGHT: 227 LBS | OXYGEN SATURATION: 96 % | HEIGHT: 68 IN

## 2024-08-05 DIAGNOSIS — R74.8 ELEVATED LIVER ENZYMES: ICD-10-CM

## 2024-08-05 DIAGNOSIS — M25.561 ACUTE PAIN OF RIGHT KNEE: ICD-10-CM

## 2024-08-05 DIAGNOSIS — M25.531 BILATERAL WRIST PAIN: ICD-10-CM

## 2024-08-05 DIAGNOSIS — F32.9 REACTIVE DEPRESSION: ICD-10-CM

## 2024-08-05 DIAGNOSIS — M25.532 BILATERAL WRIST PAIN: ICD-10-CM

## 2024-08-05 DIAGNOSIS — R73.03 PREDIABETES: Chronic | ICD-10-CM

## 2024-08-05 LAB
HBA1C MFR BLD: 5.9 % (ref ?–5.8)
POCT INT CON NEG: NEGATIVE
POCT INT CON POS: POSITIVE

## 2024-08-05 PROCEDURE — 3078F DIAST BP <80 MM HG: CPT | Performed by: FAMILY MEDICINE

## 2024-08-05 PROCEDURE — 83036 HEMOGLOBIN GLYCOSYLATED A1C: CPT | Performed by: FAMILY MEDICINE

## 2024-08-05 PROCEDURE — 99214 OFFICE O/P EST MOD 30 MIN: CPT | Performed by: FAMILY MEDICINE

## 2024-08-05 PROCEDURE — 3075F SYST BP GE 130 - 139MM HG: CPT | Performed by: FAMILY MEDICINE

## 2024-08-05 RX ORDER — MELOXICAM 15 MG/1
15 TABLET ORAL DAILY
Qty: 90 TABLET | Refills: 3 | Status: SHIPPED | OUTPATIENT
Start: 2024-08-05

## 2024-08-05 ASSESSMENT — FIBROSIS 4 INDEX: FIB4 SCORE: 1.17

## 2024-08-06 NOTE — PROGRESS NOTES
Verbal consent was acquired by the patient to use [employer] ambient listening note generation during this visit:  Yes      Chief complaint::Diagnoses of Prediabetes, Reactive depression, Bilateral wrist pain, Acute pain of right knee, and Elevated liver enzymes were pertinent to this visit.    Assessment and Plan:   The following treatment plan was discussed:     Assessment & Plan  1. Prediabetes.  This is a chronic, stable condition. The patient's A1c level today was recorded at 5.9 percent. He is advised to continue monitoring his dietary intake and ensure adequate physical exercise.    2. Reactive/grief depression.  This condition is chronic, stable. A referral to psychology service has been made. The possibility of initiating medication management was discussed, however, it is deemed beneficial.    3. Bilateral wrist pain.  This condition is a chronic, acute condition, potentially due to overuse. The patient is advised to continue using Voltaren topical. Meloxicam 15 mg once daily will be initiated in lieu of NSAIDs.    4. Acute pain of the right knee.  This condition is acute and resolved. No swelling or deformity was observed during the physical examination. A right knee x-ray has been ordered for the patient.    5. Elevated liver enzymes.  This is a chronic, stable condition. The patient's liver enzymes will be monitored. An ultrasound of the right upper quadrant has been ordered.    6. Hyperlipidemia.  This condition is chronic, stable. Given the elevated liver enzymes, statin therapy will be deferred. Therefore, Zetia 10 mg daily will be initiated, which has shown mild improvement in LDL levels. The risk of cardiovascular disease was discussed in the event.    Follow-up  A follow-up appointment is scheduled for 3 months from now.  Kevin was seen today for follow-up and lab results.    Diagnoses and all orders for this visit:    Prediabetes  -     Cancel: POCT  A1C  -     POCT Hemoglobin A1C    Reactive  depression  -     Referral to Psychology    Bilateral wrist pain  -     meloxicam (MOBIC) 15 MG tablet; Take 1 Tablet by mouth every day.    Acute pain of right knee  -     DX-KNEE 3 VIEWS Atraumatic Pain/Swelling/Deformity; Future    Elevated liver enzymes  -     HEPATIC FUNCTION PANEL; Future  -     US-RUQ; Future        Followup: Return in about 3 months (around 11/5/2024).    Subjective/HPI:   HPI:    Kevin Stoddard is a pleasant 59 y.o. male here for   Chief Complaint   Patient presents with    Follow-Up    Lab Results        History of Present Illness  The patient is a 59-year-old male who is here to review his labs.    The patient reports experiencing fatigue, which he attributes to his depression. Despite undergoing three sessions of in-person therapy through his workplace, he found the sessions insufficient.    The patient reports intermittent discomfort in his wrists, which he first noticed last week. Despite attempts at self-treatment with Advil and Voltaren, the pain persists. He denies any symptoms of popping or locking. The pain, which he describes as similar to a strain, is intermittent.    The patient engages in exercise at least twice a week, including walking his dog. However, he experiences shortness of breath when ascending the steeper side, necessitating rest. He attributes this to his weight gain.    The patient was diagnosed with non-alcoholic steatohepatitis ([facility]) or fatty liver approximately 20 years ago. He maintains a diet low in red meat and occasional consumption of processed peanut butter.    The patient was previously on simvastatin, which was discontinued due to elevated liver enzymes.    Current Medicines (including changes today)  Current Outpatient Medications   Medication Sig Dispense Refill    meloxicam (MOBIC) 15 MG tablet Take 1 Tablet by mouth every day. 90 Tablet 3    ezetimibe (ZETIA) 10 MG Tab Take 1 Tablet by mouth every day. 90 Tablet 3     "clotrimazole-betamethasone (LOTRISONE) 1-0.05 % Cream PLEASE SEE ATTACHED FOR DETAILED DIRECTIONS      Azelastine-Fluticasone 137-50 MCG/ACT Suspension SPRAY 1 SPRAY INTO THE APPROPRIATE NOSTRILS TWICE A DAY      VITAMIN D PO       Probiotic Product (PROBIOTIC PO) Take  by mouth.      NON SPECIFIED Candida support      MELATONIN PO Take  by mouth.      meclizine (ANTIVERT) 25 MG Tab Take 25 mg by mouth 3 times a day as needed for Vertigo. Indications: Sensation of Spinning or Whirling      ALPRAZolam (XANAX) 0.5 MG Tab Take 0.5 mg by mouth at bedtime as needed for Sleep.      esomeprazole (NEXIUM) 20 MG capsule Take 1 Capsule by mouth 2 times a day. 180 Capsule 3    Omega-3 Fatty Acids (FISH OIL) 1200 MG Cap Take 2 Capsule by mouth every day.      Multiple Vitamins-Minerals (MENS MULTI VITAMIN & MINERAL) Tab Take 1 Tab by mouth every day.       No current facility-administered medications for this visit.     Past Medical/ Surgical History  He  has a past medical history of Allergy, Cancer (HCC), Coital headache, Indigestion, and kidney stones.    He has no past medical history of CAD (coronary artery disease), COPD, Liver disease, Psychiatric disorder, or Seizure disorder ([facility]).  He  has a past surgical history that includes other; septoturbinoplasty (05/06/2010); ethmoidectomy (05/06/2010); antrostomy (05/06/2010); ganglion excision (Right, 06/03/2016); eye surgery; and vasectomy.       Objective:   /68   Pulse 82   Temp 36.7 °C (98.1 °F)   Ht 1.727 m (5' 8\")   Wt 103 kg (227 lb)   SpO2 96%  Body mass index is 34.52 kg/m².    Physical exam was made by observation   Physical Exam  Constitutional:       General: He is not in acute distress.  HENT:      Head: Normocephalic and atraumatic.   Eyes:      Conjunctiva/sclera: Conjunctivae normal.      Pupils: Pupils are equal, round, and reactive to light.   Pulmonary:      Effort: Pulmonary effort is normal. No respiratory distress.   Abdominal:      General: " There is no distension.   Musculoskeletal:      Cervical back: Normal range of motion and neck supple.   Skin:     General: Skin is warm and dry.      Findings: No rash.   Neurological:      Mental Status: He is alert and oriented to person, place, and time.      Gait: Gait is intact.   Psychiatric:         Mood and Affect: Affect normal.          Lab/ Imaging Results:  Results  Laboratory Studies  Total cholesterol is 116. Liver enzymes slightly elevated. A1c is 5.9%.    Please note that this dictation was created using voice recognition software. I have made every reasonable attempt to correct obvious errors, but I expect that there are errors of grammar and possibly content that I did not discover before finalizing the note.

## 2024-08-12 ENCOUNTER — HOSPITAL ENCOUNTER (OUTPATIENT)
Dept: RADIOLOGY | Facility: MEDICAL CENTER | Age: 59
End: 2024-08-12
Attending: FAMILY MEDICINE
Payer: COMMERCIAL

## 2024-08-12 DIAGNOSIS — M25.561 ACUTE PAIN OF RIGHT KNEE: ICD-10-CM

## 2024-08-12 PROCEDURE — 73562 X-RAY EXAM OF KNEE 3: CPT | Mod: RT

## 2024-09-23 ENCOUNTER — HOSPITAL ENCOUNTER (OUTPATIENT)
Dept: RADIOLOGY | Facility: MEDICAL CENTER | Age: 59
End: 2024-09-23
Attending: FAMILY MEDICINE
Payer: COMMERCIAL

## 2024-09-23 DIAGNOSIS — R74.8 ELEVATED LIVER ENZYMES: ICD-10-CM

## 2024-09-23 PROCEDURE — 76705 ECHO EXAM OF ABDOMEN: CPT

## 2024-10-22 ENCOUNTER — HOSPITAL ENCOUNTER (OUTPATIENT)
Dept: LAB | Facility: MEDICAL CENTER | Age: 59
End: 2024-10-22
Attending: FAMILY MEDICINE
Payer: COMMERCIAL

## 2024-10-22 DIAGNOSIS — R74.8 ELEVATED LIVER ENZYMES: ICD-10-CM

## 2024-10-22 LAB
ALBUMIN SERPL BCP-MCNC: 4.4 G/DL (ref 3.2–4.9)
ALP SERPL-CCNC: 61 U/L (ref 30–99)
ALT SERPL-CCNC: 100 U/L (ref 2–50)
AST SERPL-CCNC: 46 U/L (ref 12–45)
BILIRUB CONJ SERPL-MCNC: <0.2 MG/DL (ref 0.1–0.5)
BILIRUB INDIRECT SERPL-MCNC: ABNORMAL MG/DL (ref 0–1)
BILIRUB SERPL-MCNC: 0.6 MG/DL (ref 0.1–1.5)
PROT SERPL-MCNC: 6.9 G/DL (ref 6–8.2)

## 2024-10-22 PROCEDURE — 36415 COLL VENOUS BLD VENIPUNCTURE: CPT

## 2024-10-22 PROCEDURE — 80076 HEPATIC FUNCTION PANEL: CPT

## 2024-11-04 ENCOUNTER — OFFICE VISIT (OUTPATIENT)
Dept: MEDICAL GROUP | Facility: MEDICAL CENTER | Age: 59
End: 2024-11-04
Payer: COMMERCIAL

## 2024-11-04 VITALS
WEIGHT: 225 LBS | SYSTOLIC BLOOD PRESSURE: 130 MMHG | HEART RATE: 83 BPM | HEIGHT: 68 IN | TEMPERATURE: 98.2 F | OXYGEN SATURATION: 94 % | DIASTOLIC BLOOD PRESSURE: 78 MMHG | BODY MASS INDEX: 34.1 KG/M2

## 2024-11-04 DIAGNOSIS — M79.645 BILATERAL THUMB PAIN: ICD-10-CM

## 2024-11-04 DIAGNOSIS — M79.644 BILATERAL THUMB PAIN: ICD-10-CM

## 2024-11-04 DIAGNOSIS — K76.0 STEATOSIS OF LIVER: ICD-10-CM

## 2024-11-04 PROCEDURE — 3078F DIAST BP <80 MM HG: CPT | Performed by: FAMILY MEDICINE

## 2024-11-04 PROCEDURE — 99214 OFFICE O/P EST MOD 30 MIN: CPT | Performed by: FAMILY MEDICINE

## 2024-11-04 PROCEDURE — 3075F SYST BP GE 130 - 139MM HG: CPT | Performed by: FAMILY MEDICINE

## 2024-11-04 ASSESSMENT — FIBROSIS 4 INDEX: FIB4 SCORE: 0.9

## 2024-11-04 NOTE — PROGRESS NOTES
Verbal consent was acquired by the patient to use [employer] ambient listening note generation during this visit:  Yes      Chief complaint::Diagnoses of Steatosis of liver and Bilateral thumb pain were pertinent to this visit.    Assessment and Plan:   The following treatment plan was discussed:     Assessment & Plan  1. Liver steatosis.  Chronic, stable  Labs showed continued improvement of liver enzymes. An ultrasound indicated fatty liver deposits, suggesting steatosis or hepatic cellular disease. Dietary changes were recommended, including the consumption of healthy fats like avocados, olive oil, and grape seed oil, while avoiding butters, red meats, pork, schumacher, and dairy products. He was advised to keep a food diary to monitor his diet and avoid processed, greasy, and fatty foods. Regular exercise was encouraged to help improve liver function.    2. Bilateral thumb pain.  Chronic, unstable  The patient reported pain in both thumbs, especially when pushing luggage or driving. Previous x-rays were negative for arthritis. A referral to hand therapy was placed to work with a specialist on hand function and movement. He was advised to take meloxicam, which he reported helps with other pain, and to consider adding turmeric daily for its natural anti-inflammatory properties. He was instructed to avoid ibuprofen while on meloxicam.    Follow-up  Return at the end of January 2025 for annual wellness exam.  Kevin was seen today for follow-up, lab results and hand pain.    Diagnoses and all orders for this visit:    Steatosis of liver    Bilateral thumb pain  -     Referral to Hand Therapy        Followup: Return in about 2 months (around 1/4/2025) for Annual.    Subjective/HPI:   HPI:    Kevin Stoddard is a pleasant 59 y.o. male here for   Chief Complaint   Patient presents with    Follow-Up    Lab Results    Hand Pain     Near thumbs         History of Present Illness  The patient is a 59-year-old male who is  here to review his labs and discuss bilateral thumb pain.    He experiences pain in his thumbs when pushing luggage or maintaining the same hand position while driving. The pain is described as an ache. Activities such as hammering or drilling exacerbate his discomfort. He occasionally engages in diana activities. An x-ray conducted earlier this year did not reveal any abnormalities. He is currently taking meloxicam, which seems to alleviate some of his other pains.    He has been increasing his physical activity but has not observed any changes in his weight.    He had appendicitis a few weeks ago. He also had a ganglion cyst in the past.    FAMILY HISTORY  His father had cirrhosis.    Current Medicines (including changes today)  Current Outpatient Medications   Medication Sig Dispense Refill    meloxicam (MOBIC) 15 MG tablet Take 1 Tablet by mouth every day. 90 Tablet 3    ezetimibe (ZETIA) 10 MG Tab Take 1 Tablet by mouth every day. 90 Tablet 3    clotrimazole-betamethasone (LOTRISONE) 1-0.05 % Cream PLEASE SEE ATTACHED FOR DETAILED DIRECTIONS      Azelastine-Fluticasone 137-50 MCG/ACT Suspension SPRAY 1 SPRAY INTO THE APPROPRIATE NOSTRILS TWICE A DAY      VITAMIN D PO       Probiotic Product (PROBIOTIC PO) Take  by mouth.      NON SPECIFIED Candida support      MELATONIN PO Take  by mouth.      meclizine (ANTIVERT) 25 MG Tab Take 25 mg by mouth 3 times a day as needed for Vertigo. Indications: Sensation of Spinning or Whirling      ALPRAZolam (XANAX) 0.5 MG Tab Take 0.5 mg by mouth at bedtime as needed for Sleep.      esomeprazole (NEXIUM) 20 MG capsule Take 1 Capsule by mouth 2 times a day. 180 Capsule 3    Omega-3 Fatty Acids (FISH OIL) 1200 MG Cap Take 2 Capsule by mouth every day.      Multiple Vitamins-Minerals (MENS MULTI VITAMIN & MINERAL) Tab Take 1 Tab by mouth every day.       No current facility-administered medications for this visit.     Past Medical/ Surgical History  He  has a past medical  "history of Allergy, Cancer (HCC), Coital headache, Indigestion, kidney stones, and Steatosis of liver (2/9/2024).    He has no past medical history of CAD (coronary artery disease), COPD, Psychiatric disorder, or Seizure disorder (HCC).  He  has a past surgical history that includes other; septoturbinoplasty (05/06/2010); ethmoidectomy (05/06/2010); antrostomy (05/06/2010); ganglion excision (Right, 06/03/2016); eye surgery; and vasectomy.       Objective:   /78   Pulse 83   Temp 36.8 °C (98.2 °F)   Ht 1.727 m (5' 8\")   Wt 102 kg (225 lb)   SpO2 94%  Body mass index is 34.21 kg/m².    Physical exam was made by observation  Physical Exam  Constitutional:       General: He is not in acute distress.  HENT:      Head: Normocephalic and atraumatic.   Eyes:      Conjunctiva/sclera: Conjunctivae normal.      Pupils: Pupils are equal, round, and reactive to light.   Pulmonary:      Effort: Pulmonary effort is normal. No respiratory distress.   Abdominal:      General: There is no distension.   Musculoskeletal:      Right hand: Tenderness (Flexor pollicis brevis) present.      Left hand: Tenderness (Flexor pollicis brevis) present.      Cervical back: Normal range of motion and neck supple.   Skin:     General: Skin is warm and dry.      Findings: No rash.   Neurological:      Mental Status: He is alert and oriented to person, place, and time.      Gait: Gait is intact.   Psychiatric:         Mood and Affect: Affect normal.          Lab/ Imaging Results:  Results  Laboratory Studies  Liver function panel shows improvement.    Imaging  Ultrasound shows fatty liver deposits.  X-ray of bilateral hand/joint survey showed normal, no arthritic findings    Please note that this dictation was created using voice recognition software. I have made every reasonable attempt to correct obvious errors, but I expect that there are errors of grammar and possibly content that I did not discover before finalizing the note.      "

## 2024-12-03 ENCOUNTER — OFFICE VISIT (OUTPATIENT)
Dept: MEDICAL GROUP | Facility: MEDICAL CENTER | Age: 59
End: 2024-12-03
Payer: COMMERCIAL

## 2024-12-03 VITALS
HEART RATE: 80 BPM | BODY MASS INDEX: 34.1 KG/M2 | WEIGHT: 225 LBS | TEMPERATURE: 98.7 F | SYSTOLIC BLOOD PRESSURE: 132 MMHG | OXYGEN SATURATION: 98 % | DIASTOLIC BLOOD PRESSURE: 78 MMHG | HEIGHT: 68 IN

## 2024-12-03 DIAGNOSIS — R42 VERTIGO: ICD-10-CM

## 2024-12-03 DIAGNOSIS — H65.192 ACUTE MUCOID OTITIS MEDIA OF LEFT EAR: ICD-10-CM

## 2024-12-03 DIAGNOSIS — R53.83 FATIGUE, UNSPECIFIED TYPE: ICD-10-CM

## 2024-12-03 PROCEDURE — 3078F DIAST BP <80 MM HG: CPT | Performed by: FAMILY MEDICINE

## 2024-12-03 PROCEDURE — 3075F SYST BP GE 130 - 139MM HG: CPT | Performed by: FAMILY MEDICINE

## 2024-12-03 PROCEDURE — 99213 OFFICE O/P EST LOW 20 MIN: CPT | Performed by: FAMILY MEDICINE

## 2024-12-03 RX ORDER — MECLIZINE HYDROCHLORIDE 25 MG/1
25 TABLET ORAL 3 TIMES DAILY PRN
Qty: 30 TABLET | Refills: 5 | Status: SHIPPED | OUTPATIENT
Start: 2024-12-03

## 2024-12-03 RX ORDER — PREDNISONE 20 MG/1
TABLET ORAL
Qty: 15 TABLET | Refills: 0 | Status: SHIPPED | OUTPATIENT
Start: 2024-12-03 | End: 2024-12-10

## 2024-12-03 ASSESSMENT — FIBROSIS 4 INDEX: FIB4 SCORE: 0.9

## 2024-12-05 NOTE — PROGRESS NOTES
Verbal consent was acquired by the patient to use [employer] ambient listening note generation during this visit:  Yes      Chief complaint::Diagnoses of Vertigo, Fatigue, unspecified type, and Acute mucoid otitis media of left ear were pertinent to this visit.    Assessment and Plan:   The following treatment plan was discussed:     Assessment & Plan  1. Otitis media - Acute condition based on symptoms and previous experiences  - Prescribed Augmentin 875 mg twice a day for 1 week  - High-dose steroid regimen for 7 days  - Meclizine 25 mg for dizziness  - Advised to take antibiotics with food to mitigate gastrointestinal side effects  - Additional imaging if no symptom improvement    Follow-up  - Follow up as needed  Kevin was seen today for tired, dizziness and loss of balance.    Diagnoses and all orders for this visit:    Vertigo  -     amoxicillin-clavulanate (AUGMENTIN) 875-125 MG Tab; Take 1 Tablet by mouth 2 times a day.  -     predniSONE (DELTASONE) 20 MG Tab; Take 3 Tablets by mouth every day for 3 days, THEN 2 Tablets every day for 2 days, THEN 1 Tablet every day for 2 days.  -     meclizine (ANTIVERT) 25 MG Tab; Take 1 Tablet by mouth 3 times a day as needed for Vertigo, Nausea/Vomiting or Dizziness.    Fatigue, unspecified type  -     amoxicillin-clavulanate (AUGMENTIN) 875-125 MG Tab; Take 1 Tablet by mouth 2 times a day.  -     predniSONE (DELTASONE) 20 MG Tab; Take 3 Tablets by mouth every day for 3 days, THEN 2 Tablets every day for 2 days, THEN 1 Tablet every day for 2 days.    Acute mucoid otitis media of left ear  -     amoxicillin-clavulanate (AUGMENTIN) 875-125 MG Tab; Take 1 Tablet by mouth 2 times a day.  -     predniSONE (DELTASONE) 20 MG Tab; Take 3 Tablets by mouth every day for 3 days, THEN 2 Tablets every day for 2 days, THEN 1 Tablet every day for 2 days.        Followup: Return if symptoms worsen or fail to improve.    Subjective/HPI:     HPI:    Kevin Stoddard is a pleasant 59  y.o. male here for   Chief Complaint   Patient presents with    Tired    Dizziness    Loss Of Balance        History of Present Illness  The patient is a 59-year-old individual with chronic recurring fatigue and tiredness.    They have experienced intermittent fatigue and tiredness for 10-15 years, with the latest episode 2 weeks ago. They recall significant dizziness and vomiting while waiting for their father undergoing pacemaker removal, leading to an ER visit where they received meclizine and stayed overnight. A physical therapist suggested vertigo. Meclizine has been somewhat effective. They describe constant fatigue, a desire to sleep, occasional headaches, and pressure behind their left eye without nasal discharge. They use a dental appliance for sleep apnea. A recent cold left them extremely fatigued, prompting rest. No cough or runny nose. They noticed pressure in their left eye recently, no vomiting or severe nausea, but occasional room spinning, especially with quick head movements. Increased earwax production noted a few months ago. An MRI 10 years ago showed sinus issues, treated with Augmentin, resulting in improvement.    MEDICATIONS  Current: Meclizine, Augmentin    Current Medicines (including changes today)  Current Outpatient Medications   Medication Sig Dispense Refill    amoxicillin-clavulanate (AUGMENTIN) 875-125 MG Tab Take 1 Tablet by mouth 2 times a day. 14 Tablet 0    predniSONE (DELTASONE) 20 MG Tab Take 3 Tablets by mouth every day for 3 days, THEN 2 Tablets every day for 2 days, THEN 1 Tablet every day for 2 days. 15 Tablet 0    meclizine (ANTIVERT) 25 MG Tab Take 1 Tablet by mouth 3 times a day as needed for Vertigo, Nausea/Vomiting or Dizziness. 30 Tablet 5    meloxicam (MOBIC) 15 MG tablet Take 1 Tablet by mouth every day. 90 Tablet 3    ezetimibe (ZETIA) 10 MG Tab Take 1 Tablet by mouth every day. 90 Tablet 3    clotrimazole-betamethasone (LOTRISONE) 1-0.05 % Cream PLEASE SEE  "ATTACHED FOR DETAILED DIRECTIONS      Azelastine-Fluticasone 137-50 MCG/ACT Suspension SPRAY 1 SPRAY INTO THE APPROPRIATE NOSTRILS TWICE A DAY      VITAMIN D PO       Probiotic Product (PROBIOTIC PO) Take  by mouth.      NON SPECIFIED Candida support      MELATONIN PO Take  by mouth.      ALPRAZolam (XANAX) 0.5 MG Tab Take 0.5 mg by mouth at bedtime as needed for Sleep.      esomeprazole (NEXIUM) 20 MG capsule Take 1 Capsule by mouth 2 times a day. 180 Capsule 3    Omega-3 Fatty Acids (FISH OIL) 1200 MG Cap Take 2 Capsule by mouth every day.      Multiple Vitamins-Minerals (MENS MULTI VITAMIN & MINERAL) Tab Take 1 Tab by mouth every day.       No current facility-administered medications for this visit.     Past Medical/ Surgical History  He  has a past medical history of Allergy, Cancer (HCC), Coital headache, Indigestion, kidney stones, and Steatosis of liver (2/9/2024).    He has no past medical history of CAD (coronary artery disease), COPD, Psychiatric disorder, or Seizure disorder ([facility]).  He  has a past surgical history that includes other; septoturbinoplasty (05/06/2010); ethmoidectomy (05/06/2010); antrostomy (05/06/2010); ganglion excision (Right, 06/03/2016); eye surgery; and vasectomy.       Objective:   /78   Pulse 80   Temp 37.1 °C (98.7 °F)   Ht 1.727 m (5' 8\")   Wt 102 kg (225 lb)   SpO2 98%  Body mass index is 34.21 kg/m².    Physical Exam  Constitutional:       General: He is not in acute distress.     Appearance: He is not ill-appearing or toxic-appearing.   HENT:      Head: Normocephalic.      Right Ear: Tympanic membrane and external ear normal.      Left Ear: External ear normal. Tympanic membrane is injected and erythematous.      Nose: Nose normal. No rhinorrhea.      Right Sinus: No maxillary sinus tenderness or frontal sinus tenderness.      Left Sinus: No maxillary sinus tenderness or frontal sinus tenderness.      Mouth/Throat:      Mouth: Mucous membranes are moist.      " Pharynx: Oropharynx is clear. No posterior oropharyngeal erythema.   Eyes:      General:         Right eye: No discharge.         Left eye: No discharge.      Conjunctiva/sclera: Conjunctivae normal.      Pupils: Pupils are equal, round, and reactive to light.   Cardiovascular:      Rate and Rhythm: Normal rate and regular rhythm.      Heart sounds: No murmur heard.  Pulmonary:      Effort: Pulmonary effort is normal. No respiratory distress.      Breath sounds: Normal breath sounds. No wheezing.   Abdominal:      General: Abdomen is flat.   Musculoskeletal:         General: No swelling or tenderness.      Cervical back: Normal range of motion and neck supple.      Right lower leg: No edema.      Left lower leg: No edema.   Skin:     General: Skin is warm.   Neurological:      General: No focal deficit present.      Mental Status: He is alert and oriented to person, place, and time. Mental status is at baseline.   Psychiatric:         Mood and Affect: Mood normal.          Lab/ Imaging Results:  No labs or imaging to review    Please note that this dictation was created using voice recognition software. I have made every reasonable attempt to correct obvious errors, but I expect that there are errors of grammar and possibly content that I did not discover before finalizing the note.

## 2024-12-16 ENCOUNTER — OCCUPATIONAL THERAPY (OUTPATIENT)
Dept: OCCUPATIONAL THERAPY | Facility: REHABILITATION | Age: 59
End: 2024-12-16
Attending: FAMILY MEDICINE
Payer: COMMERCIAL

## 2024-12-16 DIAGNOSIS — M79.644 BILATERAL THUMB PAIN: ICD-10-CM

## 2024-12-16 DIAGNOSIS — M79.645 BILATERAL THUMB PAIN: ICD-10-CM

## 2024-12-16 PROCEDURE — 97165 OT EVAL LOW COMPLEX 30 MIN: CPT

## 2024-12-16 PROCEDURE — 97110 THERAPEUTIC EXERCISES: CPT

## 2024-12-16 ASSESSMENT — ENCOUNTER SYMPTOMS
PAIN SCALE: 3
QUALITY: DULL ACHE
PAIN SCALE AT HIGHEST: 9
PAIN SCALE AT LOWEST: 3
PAIN TIMING: INTERMITTENT
ALLEVIATING FACTORS: REST
EXACERBATED BY: ACTIVITY

## 2024-12-16 NOTE — OP THERAPY EVALUATION
Outpatient Occupational Therapy  HAND THERAPY INITIAL EVALUATION    [facility] [address]  [address]  [place] [address]  Phone:  [phone]  Fax:  [phone]    Date of Evaluation: 2024    Patient: Kevin Stoddard  YOB: 1965  MRN: [id number]     Referring Provider: DANIEL Carter  [address]  [address]  [place] [address]   Referring Diagnosis Pain in right finger(s) [M79.644];Pain in left finger(s) [M79.645]     Time Calculation    Start time: 0145  Stop time: 0230 Time Calculation (min): 45 minutes             Chief Complaint: Hand Problem, Hand Weakness, and Self Care Duties    Visit Diagnoses     ICD-10-CM   1. Bilateral thumb pain  M79.644    M79.645       Subjective:   History of Present Illness:     Date of onset:  2017    Mechanism of injury:  As per referring provider's notes:  . Bilateral thumb pain.  Chronic, unstable  The patient reported pain in both thumbs, especially when pushing luggage or driving. Previous x-rays were negative for arthritis. A referral to hand therapy was placed to work with a specialist on hand function and movement. He was advised to take meloxicam, which he reported helps with other pain, and to consider adding turmeric daily for its natural anti-inflammatory properties. He was instructed to avoid ibuprofen while on meloxicam.    Prior level of function:  Independent  Pain:     Current pain rating:  3    At best pain rating:  3    At worst pain ratin    Location:  Bilateral thenar eminence. Pain increases with prolonged gripping    Quality:  Dull ache    Pain timing:  Intermittent    Relieving factors:  Rest    Aggravating factors:  Activity    Progression:  Unchanged  Social Support:     Lives with:  Spouse  Hand dominance:  Right  Diagnostic Tests:     X-ray: normal    Treatments:     None    Patient Goals:     Patient goals for therapy:  Decreased pain, increased strength and  independence with ADLs/IADLs      Past Medical History:   Diagnosis Date    Allergy     Cancer (HCC)     basal cell , face x 2    Coital headache     Indigestion     kidney stones     Steatosis of liver 2/9/2024    Normal long history of elevated liver enzymes.  Ultrasound of his abdomen in 2011 showed normal contour  Platelet counts are normal.  Was on a statin in the past but stopped taking this medication due to increase in liver enzymes.  Recent completed labs which showed stable liver enzymes.     Past Surgical History:   Procedure Laterality Date    GANGLION EXCISION Right 06/03/2016    Procedure: GANGLION EXCISION WRIST;  Surgeon: Aman Easton M.D.;  Location: SURGERY SAME DAY [facility];  Service:     SEPTOTURBINOPLASTY  05/06/2010    Performed by ANURADHA JONAS at SURGERY SAME DAY [facility]    ETHMOIDECTOMY  05/06/2010    Performed by ANURADHA JONAS at SURGERY SAME DAY [facility]    ANTROSTOMY  05/06/2010    Performed by ANURADHA JONAS at SURGERY SAME DAY [facility]    EYE SURGERY      OTHER      eus. tubes/adenoids    VASECTOMY       Social History     Tobacco Use    Smoking status: Never    Smokeless tobacco: Never   Substance Use Topics    Alcohol use: Yes     Alcohol/week: 0.6 oz     Types: 1 Cans of beer per week     Comment: 1 per month     Family and Occupational History     Socioeconomic History    Marital status:      Spouse name: Not on file    Number of children: Not on file    Years of education: Not on file    Highest education level: Bachelor's degree (e.g., BA, AB, BS)   Occupational History    Not on file       Objective     Palpation   Left   Tenderness of the flexor pollicis longus.   Right Tenderness of the flexor pollicis longus.     Left Wrist/Hand Comments  Left flexor pollicis longus: At the A2 pulley area.     Right Wrist/Hand Comments  Right flexor pollicis longus: at the A2 pulley area.     Additional Palpation Details  R is worse than left     Tenderness      Left Wrist/Hand   No tenderness in the first dorsal compartment and carpometacarpal joint.     Right Wrist/Hand   No tenderness in the first dorsal compartment and carpometacarpal joint.     Active Range of Motion     Left Wrist   Normal active range of motion    Right Wrist   Normal active range of motion    Passive Range of Motion     Left Thumb   Normal passive range of motion    Right Thumb   Normal passive range of motion    Left Digits   Normal passive range of motion    Right Digits   Normal passive range of motion    Strength     Left Wrist/Hand      (2nd hand position)     Trial 1: 106    Right Wrist/Hand      (2nd hand position)     Trial 1: 95    Tests     Left Wrist/Hand   Negative Finkelstein's.     Right Wrist/Hand   Negative Finkelstein's.     Additional Tests Details  Increased discomfort when palpating bilateral thumbs at the A2 pulley area         Therapeutic Exercises (CPT 00425):     1. deep pressure massage at the A2 pulley area of thumbs, recommended heat application prior to massage    2. firm resistance theraputty for hand strengthening    Therapeutic Exercise Summary:  Initiated HEP and to complete 3 x a day  Recommended minimizing prolonged    Continue with Meloxicam anti-inflammatory medication       Time-based treatments/modalities:  Occupational Therapy Timed Treatment Charges  Therapeutic exercise minutes (CPT 57310): 15 minutes      Assessment and Plan:   Problem list/assessment: decreased HEP knowledge, decreased strength, limited ADL's and pain    Assessment details:  Patient is a 59 y.o male with a 7 year history of bilateral thumb pain in the volar aspect of thumb and thenar eminence.  Through evaluation today, it appears there may be some tenderness/inflammation at the A2 pulley area of both thumbs R>L.  No sign of OA or thickening of the palmar fascia (Dupuytren's).  Patient would benefit from OT intervention for conservative treatment to minimize limitations and  enhance functional use of bilateral hands.    Barriers to therapy:  None    Goals:   Short Term Goals: decrease pain, increase ADL independence, increase soft tissue/skin mobility, increase strength and independent with HEP performance  Short term goal timespan:  2-4 weeks    Long Term Goals:   Patient will improve right  strength to at least 110 pounds to enhance functional use  Patient will have 1/10 pain in bilateral hands after engaging in tasks with prolonged grasp  Paitent will score at least 75/80 on the UEFI   Long term goal timespan:  6-8 weeks    Plan:   Occupational/Hand Therapy options:  Occupational therapy treatment to continue  Planned therapy interventions:  Adaptive training to increase functional performance, home exercise training, patient/family/caregiver education, graded resistive tasks to increase strength and pain management  Other planned therapy interventions:  74701 and 75191  Planned modality interventions: electrical stimulation - unattended (CPT 74313)  Prognosis: excellent    Frequency:  1x week  Duration in weeks:  8  Duration in visits:  8  Discussed with:  Patient  Patient/caregiver understanding of therapy treatment and goals: Good understanding of therapy treatment and goals      Functional Assessment Used  UEFI = 60/80        Referring provider co-signature:  I have reviewed this plan of care and my co-signature certifies the need for services.    Certification Period: 12/16/2024 to  02/10/25    Physician Signature: ________________________________ Date: ______________

## 2024-12-23 ENCOUNTER — OCCUPATIONAL THERAPY (OUTPATIENT)
Dept: OCCUPATIONAL THERAPY | Facility: REHABILITATION | Age: 59
End: 2024-12-23
Attending: FAMILY MEDICINE
Payer: COMMERCIAL

## 2024-12-23 DIAGNOSIS — M79.644 BILATERAL THUMB PAIN: ICD-10-CM

## 2024-12-23 DIAGNOSIS — M79.645 BILATERAL THUMB PAIN: ICD-10-CM

## 2024-12-23 PROCEDURE — 97014 ELECTRIC STIMULATION THERAPY: CPT

## 2024-12-23 PROCEDURE — 97110 THERAPEUTIC EXERCISES: CPT

## 2024-12-23 NOTE — OP THERAPY DAILY TREATMENT
Outpatient Occupational Therapy  DAILY TREATMENT     [facility] Occupational [address].  [address]  [place] [address]  Phone:  [phone]  Fax:  [phone]    Date: 12/23/2024    Patient: Kevin Stoddard  YOB: 1965  MRN: [id number]     Time Calculation  Start time: 0100  Stop time: 0145 Time Calculation (min): 45 minutes         Chief Complaint: Hand Problem, Self Care Duties, and Hand Weakness    Visit #: 2    SUBJECTIVE:  On occasion the putty hurts those areas of my thumb     OBJECTIVE:  Current objective measures:   Palpation   Left   Tenderness of the flexor pollicis longus.   Right Tenderness of the flexor pollicis longus.      Left Wrist/Hand Comments  Left flexor pollicis longus: At the A2 pulley area.      Right Wrist/Hand Comments  Right flexor pollicis longus: at the A2 pulley area.      Additional Palpation Details  Left is worse today.       Tenderness      Left Wrist/Hand   No tenderness in the first dorsal compartment and carpometacarpal joint.      Right Wrist/Hand   No tenderness in the first dorsal compartment and carpometacarpal joint.      Active Range of Motion      Left Wrist   Normal active range of motion     Right Wrist   Normal active range of motion     Passive Range of Motion      Left Thumb   Normal passive range of motion     Right Thumb   Normal passive range of motion     Left Digits   Normal passive range of motion     Right Digits   Normal passive range of motion     Strength      Left Wrist/Hand       (2nd hand position)     Trial 1: 106     Right Wrist/Hand       (2nd hand position)     Trial 1: 101     Tests      Left Wrist/Hand   Negative Finkelstein's.      Right Wrist/Hand   Negative Finkelstein's.      Additional Tests Details  Increased discomfort when palpating bilateral thumbs at the A2 pulley area         Therapeutic Exercises (CPT 96804):     1. deep pressure massage at the A2 pulley area of thumbs, recommended  heat application prior to massage    2. firm resistance theraputty for hand strengthening    3. tendon glides of bilateral thumbs    Therapeutic Exercise Summary:    Recommended minimizing prolonged    Continue with Meloxicam anti-inflammatory medication     Therapeutic Treatments and Modalities:    1. E Stim Unattended (CPT 60330)    Therapeutic Treatments and Modalities Summary: IFC completed to bilateral thenar eminences today as part of pain management     Time-based treatments/modalities:  Therapeutic exercise minutes (CPT 40355): 30 minutes        Pain rating before treatment: 2  Pain rating after treatment: 0    ASSESSMENT:   Response to treatment: Pain/tenderness worse in left thenar eminence than right hand on day of evaluation.   strength improved; however continues to experience pain/tenderness when pressure applied to thenar eminence muscle belly and A2 area.  Will try IFC today to see if that helps with pain/swelling of those areas.      PLAN/RECOMMENDATIONS:   Plan for treatment: therapy treatment to continue next visit.  Planned interventions for next visit: continue with current treatment

## 2024-12-30 ENCOUNTER — OCCUPATIONAL THERAPY (OUTPATIENT)
Dept: OCCUPATIONAL THERAPY | Facility: REHABILITATION | Age: 59
End: 2024-12-30
Attending: FAMILY MEDICINE
Payer: COMMERCIAL

## 2024-12-30 DIAGNOSIS — M79.644 BILATERAL THUMB PAIN: ICD-10-CM

## 2024-12-30 DIAGNOSIS — M79.645 BILATERAL THUMB PAIN: ICD-10-CM

## 2024-12-30 PROCEDURE — 97110 THERAPEUTIC EXERCISES: CPT

## 2024-12-30 PROCEDURE — 97014 ELECTRIC STIMULATION THERAPY: CPT

## 2024-12-30 NOTE — OP THERAPY DAILY TREATMENT
Outpatient Occupational Therapy  DAILY TREATMENT     [facility] Occup[facility] [address].  [address]  [place] [address]  Phone:  [phone]  Fax:  [phone]    Date: 12/30/2024    Patient: Kevin Stoddard  YOB: 1965  MRN: [id number]     Time Calculation  Start time: 0230  Stop time: 0315 Time Calculation (min): 45 minutes         Chief Complaint: Hand Problem, Hand Weakness, and Self Care Duties    Visit #: 3    SUBJECTIVE:  I feel they are a bit more swollen over the past week    OBJECTIVE:  Current objective measures:   Palpation   Left   Tenderness of the flexor pollicis longus.   Right Tenderness of the flexor pollicis longus.      Left Wrist/Hand Comments  Left flexor pollicis longus: At the A2 pulley area.      Right Wrist/Hand Comments  Right flexor pollicis longus: at the A2 pulley area.      Additional Palpation Details  Left is worse today.       Tenderness      Left Wrist/Hand   No tenderness in the first dorsal compartment and carpometacarpal joint.      Right Wrist/Hand   No tenderness in the first dorsal compartment and carpometacarpal joint.      Active Range of Motion      Left Wrist   Normal active range of motion     Right Wrist   Normal active range of motion     Passive Range of Motion      Left Thumb   Normal passive range of motion     Right Thumb   Normal passive range of motion     Left Digits   Normal passive range of motion     Right Digits   Normal passive range of motion     Strength      Left Wrist/Hand    (2nd hand position)     Trial 1: 106     Right Wrist/Hand    (2nd hand position)     Trial 1: 101     Tests      Left Wrist/Hand   Negative Finkelstein's.      Right Wrist/Hand   Negative Finkelstein's.      Additional Tests Details  Increased discomfort when palpating bilateral thumbs at the A2 pulley area         Therapeutic Exercises (CPT 59631):     1. deep pressure massage at the A2 pulley area of thumbs, recommended heat  application prior to massage    2. firm resistance theraputty for hand strengthening    3. tendon glides of bilateral thumbs    Therapeutic Exercise Summary:      Continue with Meloxicam anti-inflammatory medication     Therapeutic Treatments and Modalities:    1. E Stim Unattended (CPT 46041)    Therapeutic Treatments and Modalities Summary: IFC completed to bilateral thenar eminences with heat as part of pain management and minimization of edema     Time-based treatments/modalities:  Therapeutic exercise minutes (CPT 20780): 45 minutes        Pain rating before treatment: 4  Pain rating after treatment: 3    ASSESSMENT:   Response to treatment: Continues to have significant crepitus at bilateral A2 pulley area and FP muscles.  Patient has been playing a lot of video games and baking lately     PLAN/RECOMMENDATIONS:   Plan for treatment: therapy treatment to continue next visit.  Planned interventions for next visit: continue with current treatment, E-stim unattended (CPT 22850), therapeutic activities (CPT 45534), and therapeutic exercise (CPT 60082)

## 2025-01-06 ENCOUNTER — OCCUPATIONAL THERAPY (OUTPATIENT)
Dept: OCCUPATIONAL THERAPY | Facility: REHABILITATION | Age: 60
End: 2025-01-06
Attending: FAMILY MEDICINE
Payer: COMMERCIAL

## 2025-01-06 ENCOUNTER — PATIENT MESSAGE (OUTPATIENT)
Dept: MEDICAL GROUP | Facility: MEDICAL CENTER | Age: 60
End: 2025-01-06

## 2025-01-06 DIAGNOSIS — M79.645 BILATERAL THUMB PAIN: ICD-10-CM

## 2025-01-06 DIAGNOSIS — M79.644 BILATERAL THUMB PAIN: ICD-10-CM

## 2025-01-06 PROCEDURE — 97110 THERAPEUTIC EXERCISES: CPT

## 2025-01-06 NOTE — OP THERAPY DAILY TREATMENT
Outpatient Occupational Therapy  DAILY TREATMENT     [facility] Occupational [address].  [address]  [place] [address]  Phone:  [phone]  Fax:  [phone]    Date: 01/06/2025    Patient: Kevin Stoddard  YOB: 1965  MRN: [id number]     Time Calculation  Start time: 0100  Stop time: 0145 Time Calculation (min): 45 minutes         Chief Complaint: Hand Problem, Hand Weakness, and Self Care Duties    Visit #: 4    SUBJECTIVE:  It doesn't feel like it is getting any better     OBJECTIVE:  Current objective measures:   Palpation   Left   Tenderness of the flexor pollicis longus.   Right Tenderness of the flexor pollicis longus.      Left Wrist/Hand Comments  Left flexor pollicis longus: At the A2 pulley area.      Right Wrist/Hand Comments  Right flexor pollicis longus: at the A2 pulley area.      Additional Palpation Details  Left is worse today.       Tenderness      Left Wrist/Hand   No tenderness in the first dorsal compartment and carpometacarpal joint.      Right Wrist/Hand   No tenderness in the first dorsal compartment and carpometacarpal joint.      Active Range of Motion      Left Wrist   Normal active range of motion     Right Wrist   Normal active range of motion     Passive Range of Motion      Left Thumb   Normal passive range of motion     Right Thumb   Normal passive range of motion     Left Digits   Normal passive range of motion     Right Digits   Normal passive range of motion     Strength      Left Wrist/Hand    (2nd hand position)     Trial 1: 106     Right Wrist/Hand    (2nd hand position)     Trial 1: 101     Tests      Left Wrist/Hand   Negative Finkelstein's.      Right Wrist/Hand   Negative Finkelstein's.      Additional Tests Details  Increased discomfort when palpating bilateral thumbs at the A2 pulley area         Therapeutic Exercises (CPT 83240):     1. deep pressure massage at the A2 pulley area of thumbs, recommended heat  application prior to massage    2. firm resistance theraputty for hand strengthening    3. tendon glides of bilateral thumbs    Therapeutic Exercise Summary:      Continue with Meloxicam anti-inflammatory medication       Time-based treatments/modalities:  Therapeutic exercise minutes (CPT 99102): 30 minutes        Pain rating before treatment: 3  Pain rating after treatment: 3    ASSESSMENT:   Response to treatment: Patient remains status quo and OT will refer back to MD with recommendation to see a hand specialist.      PLAN/RECOMMENDATIONS:   Plan for treatment: no further treatment needed.  Planned interventions for next visit:  will d/c today and to continue with HEP

## 2025-01-06 NOTE — OP THERAPY DISCHARGE SUMMARY
Outpatient Occupational Therapy  DISCHARGE SUMMARY NOTE    [facility] Occupational Therapy [address]  [address]  [place] [address]  Phone:  [phone]  Fax:  [phone]    Date of Visit: 01/06/2025    Patient: Kevin Stoddard  YOB: 1965  MRN: [id number]     Referring Provider: DANIEL Carter  [address]  [address]  [place] [address]   Referring Diagnosis: Pain in right finger(s) [M79.644];Pain in left finger(s) [M79.645]         Functional Assessment Used  UEFI = 78/80         Your patient is being discharged from Occupational Therapy with the following comments:   Progress plateau    Comments:  Patient has attended OT  for a total of 4 visits to address bilateral thumb pain.  Strength is WNL    Limitations Remaining:  Continues to have bilateral thumb pain at the A2 pulley area and in the thenar eminence.  Not sure if it is tendonitis or orthopedic in nature.  X -ray was taken but only of back of hands, not a volar aspect.      Recommendations:  X-ray of volar aspects of hands to rule out OA or bony spurs  Referral to hand specialist to further diagnose cause of symptoms.      Syl Mathews OTR/L    Date: 1/6/2025

## 2025-01-13 ENCOUNTER — HOSPITAL ENCOUNTER (OUTPATIENT)
Dept: RADIOLOGY | Facility: MEDICAL CENTER | Age: 60
End: 2025-01-13
Attending: FAMILY MEDICINE
Payer: COMMERCIAL

## 2025-01-13 ENCOUNTER — APPOINTMENT (OUTPATIENT)
Dept: OCCUPATIONAL THERAPY | Facility: REHABILITATION | Age: 60
End: 2025-01-13
Attending: FAMILY MEDICINE
Payer: COMMERCIAL

## 2025-01-13 DIAGNOSIS — M79.644 BILATERAL THUMB PAIN: ICD-10-CM

## 2025-01-13 DIAGNOSIS — M79.645 BILATERAL THUMB PAIN: ICD-10-CM

## 2025-01-13 PROCEDURE — 73130 X-RAY EXAM OF HAND: CPT | Mod: LT

## 2025-01-23 ENCOUNTER — APPOINTMENT (OUTPATIENT)
Dept: OCCUPATIONAL THERAPY | Facility: REHABILITATION | Age: 60
End: 2025-01-23
Attending: FAMILY MEDICINE
Payer: COMMERCIAL

## 2025-01-25 SDOH — ECONOMIC STABILITY: FOOD INSECURITY: WITHIN THE PAST 12 MONTHS, YOU WORRIED THAT YOUR FOOD WOULD RUN OUT BEFORE YOU GOT MONEY TO BUY MORE.: NEVER TRUE

## 2025-01-25 SDOH — HEALTH STABILITY: PHYSICAL HEALTH: ON AVERAGE, HOW MANY DAYS PER WEEK DO YOU ENGAGE IN MODERATE TO STRENUOUS EXERCISE (LIKE A BRISK WALK)?: 5 DAYS

## 2025-01-25 SDOH — ECONOMIC STABILITY: FOOD INSECURITY: WITHIN THE PAST 12 MONTHS, THE FOOD YOU BOUGHT JUST DIDN'T LAST AND YOU DIDN'T HAVE MONEY TO GET MORE.: NEVER TRUE

## 2025-01-25 SDOH — ECONOMIC STABILITY: INCOME INSECURITY: HOW HARD IS IT FOR YOU TO PAY FOR THE VERY BASICS LIKE FOOD, HOUSING, MEDICAL CARE, AND HEATING?: NOT VERY HARD

## 2025-01-25 SDOH — ECONOMIC STABILITY: INCOME INSECURITY: IN THE LAST 12 MONTHS, WAS THERE A TIME WHEN YOU WERE NOT ABLE TO PAY THE MORTGAGE OR RENT ON TIME?: NO

## 2025-01-25 SDOH — HEALTH STABILITY: PHYSICAL HEALTH: ON AVERAGE, HOW MANY MINUTES DO YOU ENGAGE IN EXERCISE AT THIS LEVEL?: 20 MIN

## 2025-01-25 ASSESSMENT — SOCIAL DETERMINANTS OF HEALTH (SDOH)
DO YOU BELONG TO ANY CLUBS OR ORGANIZATIONS SUCH AS CHURCH GROUPS UNIONS, FRATERNAL OR ATHLETIC GROUPS, OR SCHOOL GROUPS?: NO
HOW OFTEN DO YOU ATTEND CHURCH OR RELIGIOUS SERVICES?: NEVER
IN THE PAST 12 MONTHS, HAS THE ELECTRIC, GAS, OIL, OR WATER COMPANY THREATENED TO SHUT OFF SERVICE IN YOUR HOME?: NO
HOW OFTEN DO YOU HAVE A DRINK CONTAINING ALCOHOL: 2-4 TIMES A MONTH
IN A TYPICAL WEEK, HOW MANY TIMES DO YOU TALK ON THE PHONE WITH FAMILY, FRIENDS, OR NEIGHBORS?: NEVER
IN A TYPICAL WEEK, HOW MANY TIMES DO YOU TALK ON THE PHONE WITH FAMILY, FRIENDS, OR NEIGHBORS?: NEVER
HOW OFTEN DO YOU HAVE SIX OR MORE DRINKS ON ONE OCCASION: LESS THAN MONTHLY
HOW MANY DRINKS CONTAINING ALCOHOL DO YOU HAVE ON A TYPICAL DAY WHEN YOU ARE DRINKING: 1 OR 2
HOW OFTEN DO YOU ATTENT MEETINGS OF THE CLUB OR ORGANIZATION YOU BELONG TO?: NEVER
HOW OFTEN DO YOU ATTENT MEETINGS OF THE CLUB OR ORGANIZATION YOU BELONG TO?: NEVER
DO YOU BELONG TO ANY CLUBS OR ORGANIZATIONS SUCH AS CHURCH GROUPS UNIONS, FRATERNAL OR ATHLETIC GROUPS, OR SCHOOL GROUPS?: NO
HOW OFTEN DO YOU ATTEND CHURCH OR RELIGIOUS SERVICES?: NEVER
HOW OFTEN DO YOU GET TOGETHER WITH FRIENDS OR RELATIVES?: ONCE A WEEK
HOW HARD IS IT FOR YOU TO PAY FOR THE VERY BASICS LIKE FOOD, HOUSING, MEDICAL CARE, AND HEATING?: NOT VERY HARD
HOW OFTEN DO YOU GET TOGETHER WITH FRIENDS OR RELATIVES?: ONCE A WEEK
WITHIN THE PAST 12 MONTHS, YOU WORRIED THAT YOUR FOOD WOULD RUN OUT BEFORE YOU GOT THE MONEY TO BUY MORE: NEVER TRUE

## 2025-01-25 ASSESSMENT — LIFESTYLE VARIABLES
HOW OFTEN DO YOU HAVE A DRINK CONTAINING ALCOHOL: 2-4 TIMES A MONTH
AUDIT-C TOTAL SCORE: 3
HOW OFTEN DO YOU HAVE SIX OR MORE DRINKS ON ONE OCCASION: LESS THAN MONTHLY
HOW MANY STANDARD DRINKS CONTAINING ALCOHOL DO YOU HAVE ON A TYPICAL DAY: 1 OR 2
SKIP TO QUESTIONS 9-10: 0

## 2025-01-27 ENCOUNTER — APPOINTMENT (OUTPATIENT)
Dept: OCCUPATIONAL THERAPY | Facility: REHABILITATION | Age: 60
End: 2025-01-27
Attending: FAMILY MEDICINE
Payer: COMMERCIAL

## 2025-01-28 ENCOUNTER — OFFICE VISIT (OUTPATIENT)
Dept: MEDICAL GROUP | Facility: MEDICAL CENTER | Age: 60
End: 2025-01-28
Payer: COMMERCIAL

## 2025-01-28 VITALS
HEIGHT: 70 IN | TEMPERATURE: 97.7 F | HEART RATE: 87 BPM | OXYGEN SATURATION: 95 % | WEIGHT: 225.6 LBS | SYSTOLIC BLOOD PRESSURE: 110 MMHG | DIASTOLIC BLOOD PRESSURE: 62 MMHG | BODY MASS INDEX: 32.3 KG/M2

## 2025-01-28 DIAGNOSIS — Z00.00 ENCOUNTER FOR PREVENTATIVE ADULT HEALTH CARE EXAMINATION: Primary | ICD-10-CM

## 2025-01-28 DIAGNOSIS — R73.03 PREDIABETES: Chronic | ICD-10-CM

## 2025-01-28 DIAGNOSIS — Z53.20 HIV SCREENING DECLINED: ICD-10-CM

## 2025-01-28 DIAGNOSIS — Z12.5 ENCOUNTER FOR SCREENING PROSTATE SPECIFIC ANTIGEN (PSA) MEASUREMENT: ICD-10-CM

## 2025-01-28 DIAGNOSIS — E78.2 MIXED HYPERLIPIDEMIA: Chronic | ICD-10-CM

## 2025-01-28 DIAGNOSIS — J30.2 SEASONAL ALLERGIC RHINITIS, UNSPECIFIED TRIGGER: ICD-10-CM

## 2025-01-28 PROCEDURE — 99396 PREV VISIT EST AGE 40-64: CPT | Performed by: FAMILY MEDICINE

## 2025-01-28 PROCEDURE — 3078F DIAST BP <80 MM HG: CPT | Performed by: FAMILY MEDICINE

## 2025-01-28 PROCEDURE — 3074F SYST BP LT 130 MM HG: CPT | Performed by: FAMILY MEDICINE

## 2025-01-28 RX ORDER — AZELASTINE HYDROCHLORIDE, FLUTICASONE PROPIONATE 137; 50 UG/1; UG/1
1 SPRAY, METERED NASAL 2 TIMES DAILY
Qty: 46 G | Refills: 11 | Status: SHIPPED | OUTPATIENT
Start: 2025-01-28

## 2025-01-28 ASSESSMENT — FIBROSIS 4 INDEX: FIB4 SCORE: 0.9

## 2025-01-28 ASSESSMENT — PATIENT HEALTH QUESTIONNAIRE - PHQ9: CLINICAL INTERPRETATION OF PHQ2 SCORE: 0

## 2025-01-28 NOTE — PROGRESS NOTES
Verbal consent was acquired by the patient to use [employer] ambient listening note generation during this visit Yes    Subjective:     CC:   Chief Complaint   Patient presents with    Annual Exam    Medication Refill     Azelastine-Fluticasone 137-50 MCG/ACT Suspension        HPI:   Kevin Stoddard is a 59 y.o. male who presents for annual exam    History of Present Illness  The patient is a 59-year-old individual here for their annual exam.    They report no lower urinary tract symptoms. They are not on a daily aspirin regimen. Their diet includes lean meats, fresh fruits, and vegetables, with infrequent red meat consumption. They exercise daily, walking or using an elliptical machine. They report no substance abuse concerns and adhere to safety measures like seatbelt use and sunscreen application. They maintain oral hygiene and regularly visit the dentist. Diagnosed with arthritis, they use thumb braces and Voltaren gel for pain relief. They declined cortisone injections due to addiction concerns. They have attended therapy and have a follow-up next month.    SOCIAL HISTORY  They are a non-smoker.    MEDICATIONS  Current: Zetia, Voltaren gel    IMMUNIZATIONS  They are due for influenza vaccine, shingles vaccine, COVID-19 vaccine, and hepatitis B vaccine series.     Cancer screening  Colorectal Cancer Screening: Due 2027  Lung Cancer Screening: Non-smoker   Prostate Cancer Screening/PSA: Due   Lower Urinary Symptoms: None    Health Maintenance  Advanced directive: Due at 67 years old  PT/vit D for falls prevention: Taking a daily multivitamin   Cholesterol Screenin2024 non-HDL cholesterol 176  Diabetes Screenin2024 A1c 5.9%  AAA Screening: Non-smoker   Aspirin Use: Advised The 10-year ASCVD risk score (Vernell VILLEGAS, et al., 2019) is: 6.1%      Diet: Regular, eats at home a lot   Exercise: Walking and elliptical 5 times a week     Substance Abuse: No concerns     Seat belts, bike helmet, gun  safety discussed.  Sun protection used.  Routine Dental: Daily brushing, follows dentist    Infectious disease screening/Immunizations  --HIV Screening: Due   --Hepatitis C Screening: completed  --Immunizations:   Influenza: Due   HPV: Aged out   Tetanus: Due 11/2025   Shingles: Due   COVID: Due for booster  Pneumococcal : No risk factors due at 67 years old  Hep B series: Due for series   Other immunizations: None    He  has a past medical history of Allergy, Cancer (HCC), Coital headache, Indigestion, kidney stones, and Steatosis of liver (2/9/2024).    He has no past medical history of CAD (coronary artery disease), COPD, Psychiatric disorder, or Seizure disorder (HCC).  He  has a past surgical history that includes other; septoplasty, nose, with turbinoplasty (05/06/2010); ethmoidectomy (05/06/2010); antrostomy (05/06/2010); ganglion excision (Right, 06/03/2016); eye surgery; and vasectomy.    Family History   Problem Relation Age of Onset    Cancer Mother         ovarian, breast    Ovarian Cancer Mother     Breast Cancer Mother     Heart Disease Father     Diabetes Father     Diabetes Brother      Social History     Tobacco Use    Smoking status: Never    Smokeless tobacco: Never   Vaping Use    Vaping status: Never Used   Substance Use Topics    Alcohol use: Yes     Alcohol/week: 0.6 oz     Types: 1 Cans of beer per week     Comment: 1 per month    Drug use: No     He  reports being sexually active and has had partner(s) who are female. He reports using the following method of birth control/protection: Male Sterilization.    Patient Active Problem List    Diagnosis Date Noted    Steatosis of liver 02/09/2024    Rash 01/15/2024    Breast pain, right 01/15/2024    Primary insomnia 01/15/2024    Pain in both hands 01/15/2024    Panic attack 01/15/2024    Epigastric pain 01/15/2024    Obesity (BMI 30-39.9) 01/15/2024    Obesity due to excess calories with serious comorbidity 01/15/2024    Vertigo 05/02/2018     "Encounter for preventative adult health care examination 04/17/2017    Gastroesophageal reflux disease without esophagitis 03/04/2016    Hyperlipidemia 11/06/2015    Prediabetes 11/06/2015     Current Outpatient Medications   Medication Sig Dispense Refill    Azelastine-Fluticasone 137-50 MCG/ACT Suspension Administer 1 Spray into affected nostril(S) 2 times a day. 46 g 11    meclizine (ANTIVERT) 25 MG Tab Take 1 Tablet by mouth 3 times a day as needed for Vertigo, Nausea/Vomiting or Dizziness. 30 Tablet 5    meloxicam (MOBIC) 15 MG tablet Take 1 Tablet by mouth every day. 90 Tablet 3    ezetimibe (ZETIA) 10 MG Tab Take 1 Tablet by mouth every day. 90 Tablet 3    clotrimazole-betamethasone (LOTRISONE) 1-0.05 % Cream PLEASE SEE ATTACHED FOR DETAILED DIRECTIONS      VITAMIN D PO       Probiotic Product (PROBIOTIC PO) Take  by mouth.      NON SPECIFIED Candida support      MELATONIN PO Take  by mouth.      ALPRAZolam (XANAX) 0.5 MG Tab Take 0.5 mg by mouth at bedtime as needed for Sleep.      esomeprazole (NEXIUM) 20 MG capsule Take 1 Capsule by mouth 2 times a day. 180 Capsule 3    Omega-3 Fatty Acids (FISH OIL) 1200 MG Cap Take 2 Capsule by mouth every day.      Multiple Vitamins-Minerals (MENS MULTI VITAMIN & MINERAL) Tab Take 1 Tab by mouth every day.       No current facility-administered medications for this visit.     Allergies   Allergen Reactions    Nkda [No Known Drug Allergy]        Objective:   /62   Pulse 87   Temp 36.5 °C (97.7 °F) (Temporal)   Ht 1.768 m (5' 9.6\")   Wt 102 kg (225 lb 9.6 oz)   SpO2 95%   BMI 32.74 kg/m²      Wt Readings from Last 4 Encounters:   01/28/25 102 kg (225 lb 9.6 oz)   01/24/25 99.8 kg (220 lb)   12/03/24 102 kg (225 lb)   11/04/24 102 kg (225 lb)       Physical Exam  Constitutional:       General: He is not in acute distress.  HENT:      Head: Normocephalic and atraumatic.      Right Ear: Tympanic membrane and external ear normal.      Left Ear: Tympanic membrane " and external ear normal.      Nose: No nasal deformity.      Mouth/Throat:      Lips: Pink.      Mouth: Mucous membranes are moist.      Pharynx: Oropharynx is clear. Uvula midline. No posterior oropharyngeal erythema.   Eyes:      General: Lids are normal.      Extraocular Movements: Extraocular movements intact.      Conjunctiva/sclera: Conjunctivae normal.      Pupils: Pupils are equal, round, and reactive to light.   Neck:      Trachea: Trachea normal.   Cardiovascular:      Rate and Rhythm: Normal rate and regular rhythm.      Heart sounds: Normal heart sounds. No murmur heard.     No friction rub. No gallop.   Pulmonary:      Effort: Pulmonary effort is normal. No accessory muscle usage.      Breath sounds: Normal breath sounds. No wheezing or rales.   Abdominal:      General: Bowel sounds are normal.      Palpations: Abdomen is soft.      Tenderness: There is no abdominal tenderness.   Musculoskeletal:      Cervical back: Normal range of motion and neck supple.      Right lower leg: No edema.      Left lower leg: No edema.   Lymphadenopathy:      Cervical: No cervical adenopathy.   Skin:     General: Skin is warm and dry.      Findings: No rash.   Neurological:      General: No focal deficit present.      Mental Status: He is alert and oriented to person, place, and time. Mental status is at baseline.      GCS: GCS eye subscore is 4. GCS verbal subscore is 5. GCS motor subscore is 6.      Motor: No weakness.      Gait: Gait is intact.   Psychiatric:         Attention and Perception: Attention normal.         Mood and Affect: Mood and affect normal.         Speech: Speech normal.         Results         Assessment and Plan:        Assessment & Plan  1. Annual health examination.  Anticipatory guidance:  --Discussed moderation in sodium/caffeine intake, saturated fat and cholesterol, caloric balance, sufficient fresh fruits/vegetables, and fiber.  --Discussed brushing, flossing, and dental visits.    --Encouraged regular exercise, 150 mins a week of moderate to high intensity cardiovascular activity  --Discussed tobacco, alcohol, or other drug use; availability of treatment for abuse.   --Discussed sexually transmitted infections, partner selection, use of condoms, avoidance of unintended pregnancy and contraceptive alternatives.  --Injury prevention: Discussed safety belts, safety helmets, smoke detector, etc.  -Discussed diet and exercise, colorectal cancer screening, and prostate cancer screening     Health maintenance: Due for hepatitis B vaccine series, influenza vaccine, COVID-vaccine, HIV screening  Labs per orders    2. Thumb arthritis.  Chronic, stable.  - Advised to use Voltaren gel for pain relief.    3. Medication management.  - Prescription refill sent to [facility] pharmacy in [facility] in [place].    Follow-up  - Follow-up in 1 year for annual exam.  Eder was seen today for annual exam and medication refill.    Diagnoses and all orders for this visit:    Encounter for preventative adult health care examination    Mixed hyperlipidemia  -     Lipid Profile; Future    Prediabetes  -     CBC WITH DIFFERENTIAL; Future  -     Comp Metabolic Panel; Future  -     ESTIMATED GFR; Future  -     HEMOGLOBIN A1C; Future    Seasonal allergic rhinitis, unspecified trigger  -     Azelastine-Fluticasone 137-50 MCG/ACT Suspension; Administer 1 Spray into affected nostril(S) 2 times a day.    Encounter for screening prostate specific antigen (PSA) measurement  -     PROSTATE SPECIFIC AG SCREENING; Future    HIV screening declined  -     HIV AG/AB COMBO ASSAY SCREENING; Future    Other orders  -     Cancel: INFLUENZA VACCINE TRI INJ (PF)          Follow-up: Return in about 1 year (around 1/28/2026) for review labs and general follow-up.    Please note that this dictation was created using voice recognition software. I have made every reasonable attempt to correct obvious errors, but I expect that there are errors of grammar and  possibly content that I did not discover before finalizing the note.

## 2025-02-03 ENCOUNTER — APPOINTMENT (OUTPATIENT)
Dept: OCCUPATIONAL THERAPY | Facility: REHABILITATION | Age: 60
End: 2025-02-03
Attending: FAMILY MEDICINE
Payer: COMMERCIAL

## 2025-02-20 ENCOUNTER — HOSPITAL ENCOUNTER (OUTPATIENT)
Dept: LAB | Facility: MEDICAL CENTER | Age: 60
End: 2025-02-20
Attending: FAMILY MEDICINE
Payer: COMMERCIAL

## 2025-02-20 DIAGNOSIS — E78.2 MIXED HYPERLIPIDEMIA: Chronic | ICD-10-CM

## 2025-02-20 DIAGNOSIS — R73.03 PREDIABETES: Chronic | ICD-10-CM

## 2025-02-20 DIAGNOSIS — Z53.20 HIV SCREENING DECLINED: ICD-10-CM

## 2025-02-20 DIAGNOSIS — Z12.5 ENCOUNTER FOR SCREENING PROSTATE SPECIFIC ANTIGEN (PSA) MEASUREMENT: ICD-10-CM

## 2025-02-20 LAB
BASOPHILS # BLD AUTO: 1.2 % (ref 0–1.8)
BASOPHILS # BLD: 0.07 K/UL (ref 0–0.12)
EOSINOPHIL # BLD AUTO: 0.24 K/UL (ref 0–0.51)
EOSINOPHIL NFR BLD: 4.1 % (ref 0–6.9)
ERYTHROCYTE [DISTWIDTH] IN BLOOD BY AUTOMATED COUNT: 41.1 FL (ref 35.9–50)
EST. AVERAGE GLUCOSE BLD GHB EST-MCNC: 126 MG/DL
HBA1C MFR BLD: 6 % (ref 4–5.6)
HCT VFR BLD AUTO: 48.1 % (ref 42–52)
HGB BLD-MCNC: 16.2 G/DL (ref 14–18)
IMM GRANULOCYTES # BLD AUTO: 0.02 K/UL (ref 0–0.11)
IMM GRANULOCYTES NFR BLD AUTO: 0.3 % (ref 0–0.9)
LYMPHOCYTES # BLD AUTO: 2.03 K/UL (ref 1–4.8)
LYMPHOCYTES NFR BLD: 34.7 % (ref 22–41)
MCH RBC QN AUTO: 30.2 PG (ref 27–33)
MCHC RBC AUTO-ENTMCNC: 33.7 G/DL (ref 32.3–36.5)
MCV RBC AUTO: 89.6 FL (ref 81.4–97.8)
MONOCYTES # BLD AUTO: 0.65 K/UL (ref 0–0.85)
MONOCYTES NFR BLD AUTO: 11.1 % (ref 0–13.4)
NEUTROPHILS # BLD AUTO: 2.84 K/UL (ref 1.82–7.42)
NEUTROPHILS NFR BLD: 48.6 % (ref 44–72)
NRBC # BLD AUTO: 0 K/UL
NRBC BLD-RTO: 0 /100 WBC (ref 0–0.2)
PLATELET # BLD AUTO: 281 K/UL (ref 164–446)
PMV BLD AUTO: 10 FL (ref 9–12.9)
RBC # BLD AUTO: 5.37 M/UL (ref 4.7–6.1)
WBC # BLD AUTO: 5.9 K/UL (ref 4.8–10.8)

## 2025-02-20 PROCEDURE — 85025 COMPLETE CBC W/AUTO DIFF WBC: CPT

## 2025-02-20 PROCEDURE — 80053 COMPREHEN METABOLIC PANEL: CPT

## 2025-02-20 PROCEDURE — 83036 HEMOGLOBIN GLYCOSYLATED A1C: CPT

## 2025-02-20 PROCEDURE — 80061 LIPID PANEL: CPT

## 2025-02-20 PROCEDURE — 87389 HIV-1 AG W/HIV-1&-2 AB AG IA: CPT

## 2025-02-20 PROCEDURE — 36415 COLL VENOUS BLD VENIPUNCTURE: CPT

## 2025-02-20 PROCEDURE — 84153 ASSAY OF PSA TOTAL: CPT

## 2025-02-21 ENCOUNTER — RESULTS FOLLOW-UP (OUTPATIENT)
Dept: MEDICAL GROUP | Facility: MEDICAL CENTER | Age: 60
End: 2025-02-21

## 2025-02-21 DIAGNOSIS — E78.2 MIXED HYPERLIPIDEMIA: Chronic | ICD-10-CM

## 2025-02-21 DIAGNOSIS — Z12.5 ENCOUNTER FOR SCREENING PROSTATE SPECIFIC ANTIGEN (PSA) MEASUREMENT: ICD-10-CM

## 2025-02-21 DIAGNOSIS — R73.03 PREDIABETES: Chronic | ICD-10-CM

## 2025-02-21 DIAGNOSIS — K76.0 STEATOSIS OF LIVER: ICD-10-CM

## 2025-02-21 LAB
ALBUMIN SERPL BCP-MCNC: 4.4 G/DL (ref 3.2–4.9)
ALBUMIN/GLOB SERPL: 1.8 G/DL
ALP SERPL-CCNC: 53 U/L (ref 30–99)
ALT SERPL-CCNC: 108 U/L (ref 2–50)
ANION GAP SERPL CALC-SCNC: 12 MMOL/L (ref 7–16)
AST SERPL-CCNC: 51 U/L (ref 12–45)
BILIRUB SERPL-MCNC: 0.7 MG/DL (ref 0.1–1.5)
BUN SERPL-MCNC: 16 MG/DL (ref 8–22)
CALCIUM ALBUM COR SERPL-MCNC: 9 MG/DL (ref 8.5–10.5)
CALCIUM SERPL-MCNC: 9.3 MG/DL (ref 8.5–10.5)
CHLORIDE SERPL-SCNC: 103 MMOL/L (ref 96–112)
CHOLEST SERPL-MCNC: 184 MG/DL (ref 100–199)
CO2 SERPL-SCNC: 26 MMOL/L (ref 20–33)
CREAT SERPL-MCNC: 1.26 MG/DL (ref 0.5–1.4)
GFR SERPLBLD CREATININE-BSD FMLA CKD-EPI: 66 ML/MIN/1.73 M 2
GLOBULIN SER CALC-MCNC: 2.5 G/DL (ref 1.9–3.5)
GLUCOSE SERPL-MCNC: 111 MG/DL (ref 65–99)
HDLC SERPL-MCNC: 39 MG/DL
HIV 1+2 AB+HIV1 P24 AG SERPL QL IA: NORMAL
LDLC SERPL CALC-MCNC: 116 MG/DL
POTASSIUM SERPL-SCNC: 4.1 MMOL/L (ref 3.6–5.5)
PROT SERPL-MCNC: 6.9 G/DL (ref 6–8.2)
PSA SERPL DL<=0.01 NG/ML-MCNC: 0.22 NG/ML (ref 0–4)
SODIUM SERPL-SCNC: 141 MMOL/L (ref 135–145)
TRIGL SERPL-MCNC: 147 MG/DL (ref 0–149)

## 2025-05-27 DIAGNOSIS — E78.2 MIXED HYPERLIPIDEMIA: Chronic | ICD-10-CM

## 2025-05-29 RX ORDER — EZETIMIBE 10 MG/1
10 TABLET ORAL DAILY
Qty: 90 TABLET | Refills: 3 | Status: SHIPPED | OUTPATIENT
Start: 2025-05-29